# Patient Record
Sex: MALE | Race: WHITE | NOT HISPANIC OR LATINO | Employment: FULL TIME | URBAN - NONMETROPOLITAN AREA
[De-identification: names, ages, dates, MRNs, and addresses within clinical notes are randomized per-mention and may not be internally consistent; named-entity substitution may affect disease eponyms.]

---

## 2023-09-27 ENCOUNTER — OFFICE VISIT (OUTPATIENT)
Dept: PRIMARY CARE | Facility: CLINIC | Age: 33
End: 2023-09-27
Payer: COMMERCIAL

## 2023-09-27 VITALS
HEART RATE: 105 BPM | HEIGHT: 66 IN | DIASTOLIC BLOOD PRESSURE: 90 MMHG | WEIGHT: 122.25 LBS | SYSTOLIC BLOOD PRESSURE: 140 MMHG | BODY MASS INDEX: 19.65 KG/M2

## 2023-09-27 DIAGNOSIS — Z00.00 WELL ADULT EXAM: Primary | ICD-10-CM

## 2023-09-27 DIAGNOSIS — F41.9 ANXIETY: ICD-10-CM

## 2023-09-27 DIAGNOSIS — F32.0 CURRENT MILD EPISODE OF MAJOR DEPRESSIVE DISORDER WITHOUT PRIOR EPISODE (CMS-HCC): ICD-10-CM

## 2023-09-27 DIAGNOSIS — E66.9 BMI (BODY MASS INDEX) PEDIATRIC, > 99% FOR AGE, OBESE CHILD, TERTIARY CARE INTERVENTION: ICD-10-CM

## 2023-09-27 PROCEDURE — 99385 PREV VISIT NEW AGE 18-39: CPT | Performed by: NURSE PRACTITIONER

## 2023-09-27 PROCEDURE — 3008F BODY MASS INDEX DOCD: CPT | Performed by: NURSE PRACTITIONER

## 2023-09-27 PROCEDURE — 1036F TOBACCO NON-USER: CPT | Performed by: NURSE PRACTITIONER

## 2023-09-27 RX ORDER — CITALOPRAM 10 MG/1
10 TABLET ORAL DAILY
Qty: 90 EACH | Refills: 0 | Status: SHIPPED | OUTPATIENT
Start: 2023-09-27

## 2023-09-27 ASSESSMENT — ENCOUNTER SYMPTOMS
DYSURIA: 0
PALPITATIONS: 0
NAUSEA: 0
CONSTIPATION: 0
FATIGUE: 0
COLOR CHANGE: 0
BLOOD IN STOOL: 0
SHORTNESS OF BREATH: 0
NERVOUS/ANXIOUS: 1
DYSPHORIC MOOD: 0
HEADACHES: 0
DIZZINESS: 0
ABDOMINAL PAIN: 0
CHEST TIGHTNESS: 0
LIGHT-HEADEDNESS: 0
SLEEP DISTURBANCE: 0
ARTHRALGIAS: 0
VOMITING: 0
DIARRHEA: 0
MYALGIAS: 0

## 2023-09-27 ASSESSMENT — PATIENT HEALTH QUESTIONNAIRE - PHQ9
5. POOR APPETITE OR OVEREATING: NOT AT ALL
4. FEELING TIRED OR HAVING LITTLE ENERGY: NOT AT ALL
8. MOVING OR SPEAKING SO SLOWLY THAT OTHER PEOPLE COULD HAVE NOTICED. OR THE OPPOSITE, BEING SO FIGETY OR RESTLESS THAT YOU HAVE BEEN MOVING AROUND A LOT MORE THAN USUAL: NOT AT ALL
3. TROUBLE FALLING OR STAYING ASLEEP OR SLEEPING TOO MUCH: NEARLY EVERY DAY
SUM OF ALL RESPONSES TO PHQ QUESTIONS 1-9: 10
2. FEELING DOWN, DEPRESSED OR HOPELESS: NEARLY EVERY DAY
SUM OF ALL RESPONSES TO PHQ9 QUESTIONS 1 AND 2: 4
7. TROUBLE CONCENTRATING ON THINGS, SUCH AS READING THE NEWSPAPER OR WATCHING TELEVISION: NOT AT ALL
1. LITTLE INTEREST OR PLEASURE IN DOING THINGS: SEVERAL DAYS
6. FEELING BAD ABOUT YOURSELF - OR THAT YOU ARE A FAILURE OR HAVE LET YOURSELF OR YOUR FAMILY DOWN: NEARLY EVERY DAY
10. IF YOU CHECKED OFF ANY PROBLEMS, HOW DIFFICULT HAVE THESE PROBLEMS MADE IT FOR YOU TO DO YOUR WORK, TAKE CARE OF THINGS AT HOME, OR GET ALONG WITH OTHER PEOPLE: EXTREMELY DIFFICULT
9. THOUGHTS THAT YOU WOULD BE BETTER OFF DEAD, OR OF HURTING YOURSELF: NOT AT ALL

## 2023-09-27 ASSESSMENT — ANXIETY QUESTIONNAIRES
6. BECOMING EASILY ANNOYED OR IRRITABLE: NOT AT ALL
7. FEELING AFRAID AS IF SOMETHING AWFUL MIGHT HAPPEN: NOT AT ALL
2. NOT BEING ABLE TO STOP OR CONTROL WORRYING: SEVERAL DAYS
GAD7 TOTAL SCORE: 10
4. TROUBLE RELAXING: MORE THAN HALF THE DAYS
5. BEING SO RESTLESS THAT IT IS HARD TO SIT STILL: SEVERAL DAYS
3. WORRYING TOO MUCH ABOUT DIFFERENT THINGS: NEARLY EVERY DAY
1. FEELING NERVOUS, ANXIOUS, OR ON EDGE: NEARLY EVERY DAY

## 2023-09-27 NOTE — PROGRESS NOTES
"Subjective   Patient ID: Grant Carey is a 33 y.o. male who presents for Establish Care.    HPI   Grant here for NPV. Well adult exam. Has concerns regarding increase in anxiety.    Anxiety: seeing [facility] for counseling.  Has been getting panic attacks when he wake up in the mornings, and it will take him a while to \"feel\" better. He works and it is causing difficulty at work with feeling like he is making mistakes.  Would like to get started on medication. He reports that he has been on medication in the past when he was younger. He has taken Celexa and Zoloft. He tolerated both but did not really take them as \"it was a different time in my life.\" He denies suicidal ideations.     No other concerns today.       Review of Systems   Constitutional:  Negative for fatigue.   HENT: Negative.     Respiratory:  Negative for chest tightness and shortness of breath.    Cardiovascular:  Negative for chest pain, palpitations and leg swelling.   Gastrointestinal:  Negative for abdominal pain, blood in stool, constipation, diarrhea, nausea and vomiting.   Genitourinary:  Negative for dysuria.   Musculoskeletal:  Negative for arthralgias and myalgias.   Skin:  Negative for color change.   Neurological:  Negative for dizziness, light-headedness and headaches.   Psychiatric/Behavioral:  Negative for dysphoric mood, self-injury, sleep disturbance and suicidal ideas. The patient is nervous/anxious.        Objective   /90   Pulse 105   Ht 1.676 m (5' 6\")   Wt 55.5 kg (122 lb 4 oz)   BMI 19.73 kg/m²     Physical Exam  Vitals and nursing note reviewed.   Constitutional:       Appearance: Normal appearance.   HENT:      Head: Normocephalic and atraumatic.   Cardiovascular:      Rate and Rhythm: Normal rate and regular rhythm.      Pulses: Normal pulses.      Heart sounds: Normal heart sounds.   Pulmonary:      Effort: Pulmonary effort is normal.      Breath sounds: Normal breath sounds.   Abdominal:      General: Bowel " sounds are normal.      Palpations: Abdomen is soft.   Skin:     General: Skin is warm and dry.   Neurological:      General: No focal deficit present.      Mental Status: He is alert and oriented to person, place, and time.   Psychiatric:         Mood and Affect: Mood normal.         Behavior: Behavior normal.         Thought Content: Thought content normal.         Judgment: Judgment normal.         Assessment/Plan   Problem List Items Addressed This Visit             ICD-10-CM    BMI (body mass index) pediatric, > 99% for age, obese child, tertiary care intervention E66.9, Z68.54    Anxiety F41.9     Start Celexa 10 mg daily.   Discussed side effects of medication and to let us know if he has worsening of anxiety/depression and we will switch to a different medication.   GAD7: 10/21 moderate anxiety         Relevant Medications    citalopram (CeleXA) 10 mg tablet    Current mild episode of major depressive disorder without prior episode (CMS/HCC) F32.0     Start Celexa 10 mg daily.   PHQ9: 10/27 mild depression          Other Visit Diagnoses         Codes    Well adult exam    -  Primary Z00.00    Relevant Orders    Comprehensive Metabolic Panel    CBC and Auto Differential    Lipid Panel              Follow up in 1 month for medication check. We will also get lab work as he has not had wellness labs. We will call him with these results and follow up when he returns.

## 2023-09-27 NOTE — ASSESSMENT & PLAN NOTE
Start Celexa 10 mg daily.   Discussed side effects of medication and to let us know if he has worsening of anxiety/depression and we will switch to a different medication.   GAD7: 10/21 moderate anxiety

## 2023-10-31 ENCOUNTER — APPOINTMENT (OUTPATIENT)
Dept: PRIMARY CARE | Facility: CLINIC | Age: 33
End: 2023-10-31
Payer: COMMERCIAL

## 2024-03-10 ENCOUNTER — HOSPITAL ENCOUNTER (EMERGENCY)
Facility: HOSPITAL | Age: 34
Discharge: HOME | End: 2024-03-10
Attending: EMERGENCY MEDICINE
Payer: COMMERCIAL

## 2024-03-10 VITALS
HEIGHT: 67 IN | RESPIRATION RATE: 18 BRPM | DIASTOLIC BLOOD PRESSURE: 88 MMHG | TEMPERATURE: 98.1 F | OXYGEN SATURATION: 99 % | BODY MASS INDEX: 20.4 KG/M2 | SYSTOLIC BLOOD PRESSURE: 121 MMHG | WEIGHT: 130 LBS | HEART RATE: 82 BPM

## 2024-03-10 DIAGNOSIS — T69.021A TRENCH FOOT OF RIGHT LOWER EXTREMITY, INITIAL ENCOUNTER: Primary | ICD-10-CM

## 2024-03-10 PROCEDURE — 99283 EMERGENCY DEPT VISIT LOW MDM: CPT

## 2024-03-10 PROCEDURE — 99282 EMERGENCY DEPT VISIT SF MDM: CPT

## 2024-03-10 RX ORDER — TRIAMCINOLONE ACETONIDE 1 MG/G
1 CREAM TOPICAL 2 TIMES DAILY
Qty: 15 G | Refills: 0 | Status: SHIPPED | OUTPATIENT
Start: 2024-03-10 | End: 2024-03-20

## 2024-03-10 SDOH — HEALTH STABILITY: MENTAL HEALTH: BEHAVIORS/MOOD: ANXIOUS

## 2024-03-10 ASSESSMENT — COLUMBIA-SUICIDE SEVERITY RATING SCALE - C-SSRS
1. IN THE PAST MONTH, HAVE YOU WISHED YOU WERE DEAD OR WISHED YOU COULD GO TO SLEEP AND NOT WAKE UP?: NO
6. HAVE YOU EVER DONE ANYTHING, STARTED TO DO ANYTHING, OR PREPARED TO DO ANYTHING TO END YOUR LIFE?: NO
2. HAVE YOU ACTUALLY HAD ANY THOUGHTS OF KILLING YOURSELF?: NO

## 2024-03-10 ASSESSMENT — PAIN - FUNCTIONAL ASSESSMENT: PAIN_FUNCTIONAL_ASSESSMENT: 0-10

## 2024-03-10 ASSESSMENT — PAIN DESCRIPTION - PROGRESSION: CLINICAL_PROGRESSION: NOT CHANGED

## 2024-03-10 ASSESSMENT — PAIN SCALES - GENERAL: PAINLEVEL_OUTOF10: 0 - NO PAIN

## 2024-03-10 NOTE — ED PROVIDER NOTES
HPI   Chief Complaint   Patient presents with    Foot Injury     Patient presents with red, painful foot burning since December. Symptoms are only on right foot and began after exfoliating feet in December. Patient reports redness and pain has continued to get worse since December with the use of antifungals and other over the counter creams. 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 Patient is fearful that he has TB of the skin or skin cancer.        Patient presents with concern for redness to his right foot.  States has been ongoing for months now.  States he believes it started after he exfoliated his feet bilaterally.  After which she noticed redness to the right foot.  He has been applying multiple different types of over-the-counter ointments and creams.  He has not been able to make the redness go away.  He states that occasionally burns.  Patient is concerned that he works with chemicals at work and his feet are wet all the time.    He had a telemedicine visit and was placed on Keflex which she has not started yet.      History provided by:  Patient                      [place] Coma Scale Score: 15                     Patient History   Past Medical History:   Diagnosis Date    Anxiety     Depression      Past Surgical History:   Procedure Laterality Date    CT ANGIO NECK W AND WO IV CONTRAST  07/16/2022    CT NECK ANGIO W AND WO IV CONTRAST 7/16/2022 [facility] EMERGENCY LEGACY    CT HEAD ANGIO W AND WO IV CONTRAST  07/16/2022    CT HEAD ANGIO W AND WO IV CONTRAST 7/16/2022 [facility] EMERGENCY LEGACY    VASECTOMY       Family History   Problem Relation Name Age of Onset    Heart disease Paternal Grandmother      Hypertension Paternal Grandmother       Social History     Tobacco Use    Smoking status: Never    Smokeless tobacco: Never   Vaping Use    Vaping Use: Never used   Substance Use Topics    Alcohol use: Yes     Alcohol/week: 2.0  standard drinks of alcohol     Types: 2 Cans of beer per week    Drug use: Never       Physical Exam   ED Triage Vitals [03/10/24 1704]   Temperature Heart Rate Respirations BP   36.7 °C (98.1 °F) 82 18 121/88      Pulse Ox Temp Source Heart Rate Source Patient Position   99 % Temporal Monitor Sitting      BP Location FiO2 (%)     Left arm --       Physical Exam  Vitals and nursing note reviewed.   Constitutional:       General: He is not in acute distress.     Appearance: Normal appearance. He is normal weight. He is not ill-appearing or toxic-appearing.   HENT:      Head: Normocephalic.   Eyes:      General: No scleral icterus.     Conjunctiva/sclera: Conjunctivae normal.   Cardiovascular:      Pulses:           Dorsalis pedis pulses are 2+ on the right side.        Posterior tibial pulses are 2+ on the right side.   Musculoskeletal:        Feet:    Feet:      Right foot:      Skin integrity: No ulcer, blister, skin breakdown or fissure.      Toenail Condition: Right toenails are normal.   Skin:     General: Skin is warm.      Capillary Refill: Capillary refill takes less than 2 seconds.   Neurological:      General: No focal deficit present.      Mental Status: He is alert and oriented to person, place, and time.      Cranial Nerves: No cranial nerve deficit or facial asymmetry.      Sensory: No sensory deficit.      Motor: No weakness.      Gait: Gait normal.   Psychiatric:         Attention and Perception: Attention and perception normal.         Mood and Affect: Mood and affect normal.         Speech: Speech normal.         Behavior: Behavior normal. Behavior is cooperative.         Thought Content: Thought content normal.         Cognition and Memory: Cognition and memory normal.         Judgment: Judgment normal.         ED Course & MDM   Diagnoses as of 03/10/24 1715   Trench foot of right lower extremity, initial encounter       Medical Decision Making  Patient presents with concern for redness to his  right foot.  States has been ongoing for months now.  States he believes it started after he exfoliated his feet bilaterally.  After which she noticed redness to the right foot.  He has been applying multiple different types of over-the-counter ointments and creams.  He has not been able to make the redness go away.  He states that occasionally burns.  Patient is concerned that he works with chemicals at work and his feet are wet all the time.    He had a telemedicine visit and was placed on Keflex which she has not started yet.    Ddx: Contact dermatitis, trench foot, tinea pedis, other    As patient states that his feet are wet daily during working hours most likely patient is suffering from trench foot although it is odd that is not bilateral.  Will place patient on steroid ointment.  Follow-up with podiatry.  He may start the antibiotic as directed.  He is encouraged to keep his feet dry and wear waterproof boots at work.  Patient discharged home in improved and stable condition        Procedure  Procedures     Maame Alexandra PA-C  03/10/24 0777

## 2024-12-30 ENCOUNTER — APPOINTMENT (OUTPATIENT)
Dept: CARDIOLOGY | Facility: HOSPITAL | Age: 34
End: 2024-12-30
Payer: COMMERCIAL

## 2024-12-30 ENCOUNTER — HOSPITAL ENCOUNTER (INPATIENT)
Facility: HOSPITAL | Age: 34
LOS: 1 days | Discharge: LEFT BEFORE DISCHARGE | End: 2024-12-31
Attending: EMERGENCY MEDICINE | Admitting: STUDENT IN AN ORGANIZED HEALTH CARE EDUCATION/TRAINING PROGRAM
Payer: COMMERCIAL

## 2024-12-30 DIAGNOSIS — E87.1 HYPONATREMIA: Primary | ICD-10-CM

## 2024-12-30 DIAGNOSIS — E83.42 HYPOMAGNESEMIA: ICD-10-CM

## 2024-12-30 DIAGNOSIS — F10.939 ALCOHOL WITHDRAWAL SYNDROME WITH COMPLICATION (MULTI): ICD-10-CM

## 2024-12-30 DIAGNOSIS — F10.10 ALCOHOL ABUSE: ICD-10-CM

## 2024-12-30 LAB
ALBUMIN SERPL BCP-MCNC: 4.3 G/DL (ref 3.4–5)
ALP SERPL-CCNC: 108 U/L (ref 33–120)
ALT SERPL W P-5'-P-CCNC: 56 U/L (ref 10–52)
ANION GAP SERPL CALC-SCNC: 17 MMOL/L (ref 10–20)
AST SERPL W P-5'-P-CCNC: 96 U/L (ref 9–39)
BASOPHILS # BLD AUTO: 0.08 X10*3/UL (ref 0–0.1)
BASOPHILS NFR BLD AUTO: 0.4 %
BILIRUB SERPL-MCNC: 2.5 MG/DL (ref 0–1.2)
BUN SERPL-MCNC: 8 MG/DL (ref 6–23)
CALCIUM SERPL-MCNC: 8.3 MG/DL (ref 8.6–10.3)
CHLORIDE SERPL-SCNC: 77 MMOL/L (ref 98–107)
CO2 SERPL-SCNC: 23 MMOL/L (ref 21–32)
CREAT SERPL-MCNC: 0.52 MG/DL (ref 0.5–1.3)
EGFRCR SERPLBLD CKD-EPI 2021: >90 ML/MIN/1.73M*2
EOSINOPHIL # BLD AUTO: 0.02 X10*3/UL (ref 0–0.7)
EOSINOPHIL NFR BLD AUTO: 0.1 %
ERYTHROCYTE [DISTWIDTH] IN BLOOD BY AUTOMATED COUNT: 11 % (ref 11.5–14.5)
ETHANOL SERPL-MCNC: <10 MG/DL
GLUCOSE SERPL-MCNC: 113 MG/DL (ref 74–99)
HCT VFR BLD AUTO: 36.4 % (ref 41–52)
HGB BLD-MCNC: 13.6 G/DL (ref 13.5–17.5)
IMM GRANULOCYTES # BLD AUTO: 0.12 X10*3/UL (ref 0–0.7)
IMM GRANULOCYTES NFR BLD AUTO: 0.5 % (ref 0–0.9)
LIPASE SERPL-CCNC: 21 U/L (ref 9–82)
LYMPHOCYTES # BLD AUTO: 2.22 X10*3/UL (ref 1.2–4.8)
LYMPHOCYTES NFR BLD AUTO: 10 %
MAGNESIUM SERPL-MCNC: 1.21 MG/DL (ref 1.6–2.4)
MCH RBC QN AUTO: 30.8 PG (ref 26–34)
MCHC RBC AUTO-ENTMCNC: 37.4 G/DL (ref 32–36)
MCV RBC AUTO: 83 FL (ref 80–100)
MONOCYTES # BLD AUTO: 1.72 X10*3/UL (ref 0.1–1)
MONOCYTES NFR BLD AUTO: 7.7 %
NEUTROPHILS # BLD AUTO: 18.05 X10*3/UL (ref 1.2–7.7)
NEUTROPHILS NFR BLD AUTO: 81.3 %
NRBC BLD-RTO: 0 /100 WBCS (ref 0–0)
PLATELET # BLD AUTO: 244 X10*3/UL (ref 150–450)
POTASSIUM SERPL-SCNC: 3.4 MMOL/L (ref 3.5–5.3)
PROT SERPL-MCNC: 6.6 G/DL (ref 6.4–8.2)
RBC # BLD AUTO: 4.41 X10*6/UL (ref 4.5–5.9)
SODIUM SERPL-SCNC: 114 MMOL/L (ref 136–145)
WBC # BLD AUTO: 22.2 X10*3/UL (ref 4.4–11.3)

## 2024-12-30 PROCEDURE — 96365 THER/PROPH/DIAG IV INF INIT: CPT

## 2024-12-30 PROCEDURE — 83690 ASSAY OF LIPASE: CPT | Performed by: EMERGENCY MEDICINE

## 2024-12-30 PROCEDURE — 36415 COLL VENOUS BLD VENIPUNCTURE: CPT | Performed by: EMERGENCY MEDICINE

## 2024-12-30 PROCEDURE — 80053 COMPREHEN METABOLIC PANEL: CPT | Performed by: EMERGENCY MEDICINE

## 2024-12-30 PROCEDURE — 2500000004 HC RX 250 GENERAL PHARMACY W/ HCPCS (ALT 636 FOR OP/ED): Performed by: EMERGENCY MEDICINE

## 2024-12-30 PROCEDURE — 83735 ASSAY OF MAGNESIUM: CPT | Performed by: EMERGENCY MEDICINE

## 2024-12-30 PROCEDURE — 82077 ASSAY SPEC XCP UR&BREATH IA: CPT | Performed by: EMERGENCY MEDICINE

## 2024-12-30 PROCEDURE — 99285 EMERGENCY DEPT VISIT HI MDM: CPT | Mod: 25 | Performed by: EMERGENCY MEDICINE

## 2024-12-30 PROCEDURE — 93005 ELECTROCARDIOGRAM TRACING: CPT

## 2024-12-30 PROCEDURE — 96361 HYDRATE IV INFUSION ADD-ON: CPT

## 2024-12-30 PROCEDURE — 96375 TX/PRO/DX INJ NEW DRUG ADDON: CPT

## 2024-12-30 PROCEDURE — 85025 COMPLETE CBC W/AUTO DIFF WBC: CPT | Performed by: EMERGENCY MEDICINE

## 2024-12-30 PROCEDURE — 2500000004 HC RX 250 GENERAL PHARMACY W/ HCPCS (ALT 636 FOR OP/ED)

## 2024-12-30 RX ORDER — PROCHLORPERAZINE EDISYLATE 5 MG/ML
INJECTION INTRAMUSCULAR; INTRAVENOUS
Status: COMPLETED
Start: 2024-12-30 | End: 2024-12-30

## 2024-12-30 RX ORDER — LORAZEPAM 2 MG/ML
2 INJECTION INTRAMUSCULAR ONCE
Status: COMPLETED | OUTPATIENT
Start: 2024-12-30 | End: 2024-12-30

## 2024-12-30 RX ORDER — PROCHLORPERAZINE EDISYLATE 5 MG/ML
5 INJECTION INTRAMUSCULAR; INTRAVENOUS ONCE
Status: COMPLETED | OUTPATIENT
Start: 2024-12-30 | End: 2024-12-30

## 2024-12-30 RX ORDER — ONDANSETRON HYDROCHLORIDE 2 MG/ML
4 INJECTION, SOLUTION INTRAVENOUS ONCE
Status: DISCONTINUED | OUTPATIENT
Start: 2024-12-30 | End: 2024-12-30

## 2024-12-30 RX ORDER — MAGNESIUM SULFATE HEPTAHYDRATE 40 MG/ML
2 INJECTION, SOLUTION INTRAVENOUS ONCE
Status: COMPLETED | OUTPATIENT
Start: 2024-12-30 | End: 2024-12-31

## 2024-12-30 RX ADMIN — MAGNESIUM SULFATE HEPTAHYDRATE 2 G: 40 INJECTION, SOLUTION INTRAVENOUS at 23:56

## 2024-12-30 RX ADMIN — SODIUM CHLORIDE 1000 ML: 9 INJECTION, SOLUTION INTRAVENOUS at 21:28

## 2024-12-30 RX ADMIN — LORAZEPAM 2 MG: 2 INJECTION INTRAMUSCULAR; INTRAVENOUS at 21:31

## 2024-12-30 RX ADMIN — PROCHLORPERAZINE EDISYLATE 5 MG: 5 INJECTION INTRAMUSCULAR; INTRAVENOUS at 21:29

## 2024-12-30 SDOH — HEALTH STABILITY: MENTAL HEALTH: BEHAVIORAL HEALTH(WDL): EXCEPTIONS TO WDL

## 2024-12-30 SDOH — HEALTH STABILITY: MENTAL HEALTH: NEEDS EXPRESSED: EMOTIONAL

## 2024-12-30 SDOH — HEALTH STABILITY: MENTAL HEALTH: BEHAVIORS/MOOD: ANXIOUS;RESTLESS

## 2024-12-30 SDOH — SOCIAL STABILITY: SOCIAL NETWORK: PARENT/GUARDIAN/SIGNIFICANT OTHER INVOLVEMENT: ATTENTIVE TO PATIENT NEEDS

## 2024-12-30 ASSESSMENT — LIFESTYLE VARIABLES
VISUAL DISTURBANCES: NOT PRESENT
AUDITORY DISTURBANCES: NOT PRESENT
VISUAL DISTURBANCES: NOT PRESENT
HEADACHE, FULLNESS IN HEAD: NOT PRESENT
AGITATION: NORMAL ACTIVITY
NAUSEA AND VOMITING: 3
HEADACHE, FULLNESS IN HEAD: VERY MILD
PULSE: 77
BLOOD PRESSURE: 150/90
TOTAL SCORE: 3
TREMOR: NOT VISIBLE, BUT CAN BE FELT FINGERTIP TO FINGERTIP
ANXIETY: 3
TREMOR: MODERATE, WITH PATIENT'S ARMS EXTENDED
BLOOD PRESSURE: 141/84
AUDITORY DISTURBANCES: NOT PRESENT
HEADACHE, FULLNESS IN HEAD: NOT PRESENT
ORIENTATION AND CLOUDING OF SENSORIUM: ORIENTED AND CAN DO SERIAL ADDITIONS
ORIENTATION AND CLOUDING OF SENSORIUM: ORIENTED AND CAN DO SERIAL ADDITIONS
TOTAL SCORE: 10
AGITATION: NORMAL ACTIVITY
TREMOR: NOT VISIBLE, BUT CAN BE FELT FINGERTIP TO FINGERTIP
ANXIETY: MILDLY ANXIOUS
AGITATION: NORMAL ACTIVITY
NAUSEA AND VOMITING: NO NAUSEA AND NO VOMITING
VISUAL DISTURBANCES: NOT PRESENT
ANXIETY: NO ANXIETY, AT EASE
PAROXYSMAL SWEATS: 2
PULSE: 82
NAUSEA AND VOMITING: MILD NAUSEA WITH NO VOMITING
PAROXYSMAL SWEATS: NO SWEAT VISIBLE
ORIENTATION AND CLOUDING OF SENSORIUM: ORIENTED AND CAN DO SERIAL ADDITIONS
PAROXYSMAL SWEATS: 2
AUDITORY DISTURBANCES: NOT PRESENT
TOTAL SCORE: 6

## 2024-12-30 ASSESSMENT — COLUMBIA-SUICIDE SEVERITY RATING SCALE - C-SSRS
6. HAVE YOU EVER DONE ANYTHING, STARTED TO DO ANYTHING, OR PREPARED TO DO ANYTHING TO END YOUR LIFE?: NO
1. IN THE PAST MONTH, HAVE YOU WISHED YOU WERE DEAD OR WISHED YOU COULD GO TO SLEEP AND NOT WAKE UP?: NO
2. HAVE YOU ACTUALLY HAD ANY THOUGHTS OF KILLING YOURSELF?: NO

## 2024-12-31 VITALS
HEART RATE: 73 BPM | SYSTOLIC BLOOD PRESSURE: 143 MMHG | OXYGEN SATURATION: 99 % | WEIGHT: 129.41 LBS | DIASTOLIC BLOOD PRESSURE: 99 MMHG | HEIGHT: 67 IN | BODY MASS INDEX: 20.31 KG/M2 | RESPIRATION RATE: 22 BRPM | TEMPERATURE: 97.9 F

## 2024-12-31 PROBLEM — E87.1 HYPONATREMIA: Status: ACTIVE | Noted: 2024-12-31

## 2024-12-31 LAB
ALBUMIN SERPL BCP-MCNC: 4 G/DL (ref 3.4–5)
ALBUMIN SERPL BCP-MCNC: 4.2 G/DL (ref 3.4–5)
ALBUMIN SERPL BCP-MCNC: 4.3 G/DL (ref 3.4–5)
ALP SERPL-CCNC: 106 U/L (ref 33–120)
ALP SERPL-CCNC: 114 U/L (ref 33–120)
ALP SERPL-CCNC: 115 U/L (ref 33–120)
ALT SERPL W P-5'-P-CCNC: 49 U/L (ref 10–52)
ALT SERPL W P-5'-P-CCNC: 52 U/L (ref 10–52)
ALT SERPL W P-5'-P-CCNC: 53 U/L (ref 10–52)
ANION GAP SERPL CALC-SCNC: 11 MMOL/L (ref 10–20)
ANION GAP SERPL CALC-SCNC: 14 MMOL/L (ref 10–20)
ANION GAP SERPL CALC-SCNC: 9 MMOL/L (ref 10–20)
AST SERPL W P-5'-P-CCNC: 59 U/L (ref 9–39)
AST SERPL W P-5'-P-CCNC: 72 U/L (ref 9–39)
AST SERPL W P-5'-P-CCNC: 75 U/L (ref 9–39)
ATRIAL RATE: 90 BPM
BILIRUB SERPL-MCNC: 1.7 MG/DL (ref 0–1.2)
BILIRUB SERPL-MCNC: 2.4 MG/DL (ref 0–1.2)
BILIRUB SERPL-MCNC: 3.3 MG/DL (ref 0–1.2)
BUN SERPL-MCNC: 5 MG/DL (ref 6–23)
BUN SERPL-MCNC: 7 MG/DL (ref 6–23)
BUN SERPL-MCNC: 7 MG/DL (ref 6–23)
CALCIUM SERPL-MCNC: 8.6 MG/DL (ref 8.6–10.3)
CHLORIDE SERPL-SCNC: 94 MMOL/L (ref 98–107)
CHLORIDE SERPL-SCNC: 98 MMOL/L (ref 98–107)
CHLORIDE SERPL-SCNC: 99 MMOL/L (ref 98–107)
CO2 SERPL-SCNC: 27 MMOL/L (ref 21–32)
CO2 SERPL-SCNC: 27 MMOL/L (ref 21–32)
CO2 SERPL-SCNC: 30 MMOL/L (ref 21–32)
CREAT SERPL-MCNC: 0.51 MG/DL (ref 0.5–1.3)
CREAT SERPL-MCNC: 0.52 MG/DL (ref 0.5–1.3)
CREAT SERPL-MCNC: 0.6 MG/DL (ref 0.5–1.3)
EGFRCR SERPLBLD CKD-EPI 2021: >90 ML/MIN/1.73M*2
GLUCOSE SERPL-MCNC: 100 MG/DL (ref 74–99)
GLUCOSE SERPL-MCNC: 93 MG/DL (ref 74–99)
GLUCOSE SERPL-MCNC: 97 MG/DL (ref 74–99)
HOLD SPECIMEN: NORMAL
HOLD SPECIMEN: NORMAL
MAGNESIUM SERPL-MCNC: 2.15 MG/DL (ref 1.6–2.4)
MAGNESIUM SERPL-MCNC: 2.25 MG/DL (ref 1.6–2.4)
MAGNESIUM SERPL-MCNC: 2.34 MG/DL (ref 1.6–2.4)
P AXIS: 82 DEGREES
P OFFSET: 199 MS
P ONSET: 150 MS
POTASSIUM SERPL-SCNC: 3.1 MMOL/L (ref 3.5–5.3)
POTASSIUM SERPL-SCNC: 3.7 MMOL/L (ref 3.5–5.3)
POTASSIUM SERPL-SCNC: 3.9 MMOL/L (ref 3.5–5.3)
PR INTERVAL: 134 MS
PROT SERPL-MCNC: 6 G/DL (ref 6.4–8.2)
PROT SERPL-MCNC: 6.5 G/DL (ref 6.4–8.2)
PROT SERPL-MCNC: 6.7 G/DL (ref 6.4–8.2)
Q ONSET: 217 MS
QRS COUNT: 15 BEATS
QRS DURATION: 96 MS
QT INTERVAL: 402 MS
QTC CALCULATION(BAZETT): 491 MS
QTC FREDERICIA: 460 MS
R AXIS: 82 DEGREES
SODIUM SERPL-SCNC: 131 MMOL/L (ref 136–145)
SODIUM SERPL-SCNC: 133 MMOL/L (ref 136–145)
SODIUM SERPL-SCNC: 134 MMOL/L (ref 136–145)
T AXIS: 71 DEGREES
T OFFSET: 418 MS
VENTRICULAR RATE: 90 BPM

## 2024-12-31 PROCEDURE — 83735 ASSAY OF MAGNESIUM: CPT | Performed by: STUDENT IN AN ORGANIZED HEALTH CARE EDUCATION/TRAINING PROGRAM

## 2024-12-31 PROCEDURE — 2500000004 HC RX 250 GENERAL PHARMACY W/ HCPCS (ALT 636 FOR OP/ED): Performed by: STUDENT IN AN ORGANIZED HEALTH CARE EDUCATION/TRAINING PROGRAM

## 2024-12-31 PROCEDURE — 36415 COLL VENOUS BLD VENIPUNCTURE: CPT | Performed by: STUDENT IN AN ORGANIZED HEALTH CARE EDUCATION/TRAINING PROGRAM

## 2024-12-31 PROCEDURE — 2500000002 HC RX 250 W HCPCS SELF ADMINISTERED DRUGS (ALT 637 FOR MEDICARE OP, ALT 636 FOR OP/ED): Performed by: INTERNAL MEDICINE

## 2024-12-31 PROCEDURE — S4991 NICOTINE PATCH NONLEGEND: HCPCS | Performed by: INTERNAL MEDICINE

## 2024-12-31 PROCEDURE — 80053 COMPREHEN METABOLIC PANEL: CPT | Performed by: STUDENT IN AN ORGANIZED HEALTH CARE EDUCATION/TRAINING PROGRAM

## 2024-12-31 PROCEDURE — 99291 CRITICAL CARE FIRST HOUR: CPT | Performed by: STUDENT IN AN ORGANIZED HEALTH CARE EDUCATION/TRAINING PROGRAM

## 2024-12-31 PROCEDURE — 2500000001 HC RX 250 WO HCPCS SELF ADMINISTERED DRUGS (ALT 637 FOR MEDICARE OP): Performed by: STUDENT IN AN ORGANIZED HEALTH CARE EDUCATION/TRAINING PROGRAM

## 2024-12-31 PROCEDURE — 2020000001 HC ICU ROOM DAILY

## 2024-12-31 RX ORDER — CITALOPRAM 10 MG/1
10 TABLET ORAL DAILY
Status: DISCONTINUED | OUTPATIENT
Start: 2024-12-31 | End: 2024-12-31 | Stop reason: CLARIF

## 2024-12-31 RX ORDER — LORAZEPAM 2 MG/ML
2 INJECTION INTRAMUSCULAR EVERY 2 HOUR PRN
Status: DISCONTINUED | OUTPATIENT
Start: 2024-12-31 | End: 2024-12-31 | Stop reason: HOSPADM

## 2024-12-31 RX ORDER — THIAMINE HYDROCHLORIDE 100 MG/ML
100 INJECTION, SOLUTION INTRAMUSCULAR; INTRAVENOUS DAILY
Status: DISCONTINUED | OUTPATIENT
Start: 2024-12-31 | End: 2024-12-31 | Stop reason: HOSPADM

## 2024-12-31 RX ORDER — IBUPROFEN 200 MG
1 TABLET ORAL DAILY
Status: DISCONTINUED | OUTPATIENT
Start: 2024-12-31 | End: 2024-12-31 | Stop reason: HOSPADM

## 2024-12-31 RX ORDER — IBUPROFEN 200 MG
200 TABLET ORAL NIGHTLY
COMMUNITY

## 2024-12-31 RX ORDER — ONDANSETRON HYDROCHLORIDE 2 MG/ML
4 INJECTION, SOLUTION INTRAVENOUS EVERY 4 HOURS PRN
Status: DISCONTINUED | OUTPATIENT
Start: 2024-12-31 | End: 2024-12-31 | Stop reason: HOSPADM

## 2024-12-31 RX ORDER — LORAZEPAM 2 MG/ML
0.5 INJECTION INTRAMUSCULAR EVERY 2 HOUR PRN
Status: DISCONTINUED | OUTPATIENT
Start: 2024-12-31 | End: 2024-12-31 | Stop reason: HOSPADM

## 2024-12-31 RX ORDER — FOLIC ACID 1 MG/1
1 TABLET ORAL DAILY
Status: DISCONTINUED | OUTPATIENT
Start: 2024-12-31 | End: 2024-12-31 | Stop reason: HOSPADM

## 2024-12-31 RX ORDER — TALC
6 POWDER (GRAM) TOPICAL NIGHTLY
Status: DISCONTINUED | OUTPATIENT
Start: 2024-12-31 | End: 2024-12-31 | Stop reason: HOSPADM

## 2024-12-31 RX ORDER — NALTREXONE HYDROCHLORIDE 50 MG/1
50 TABLET, FILM COATED ORAL NIGHTLY
COMMUNITY
Start: 2024-08-12

## 2024-12-31 RX ORDER — SODIUM CHLORIDE 9 MG/ML
100 INJECTION, SOLUTION INTRAVENOUS CONTINUOUS
Status: DISCONTINUED | OUTPATIENT
Start: 2024-12-31 | End: 2024-12-31 | Stop reason: HOSPADM

## 2024-12-31 RX ORDER — LANOLIN ALCOHOL/MO/W.PET/CERES
100 CREAM (GRAM) TOPICAL DAILY
Status: DISCONTINUED | OUTPATIENT
Start: 2025-01-03 | End: 2024-12-31 | Stop reason: HOSPADM

## 2024-12-31 RX ORDER — ESCITALOPRAM OXALATE 10 MG/1
5 TABLET ORAL DAILY
Status: DISCONTINUED | OUTPATIENT
Start: 2024-12-31 | End: 2024-12-31 | Stop reason: HOSPADM

## 2024-12-31 RX ORDER — LORAZEPAM 2 MG/ML
1 INJECTION INTRAMUSCULAR EVERY 2 HOUR PRN
Status: DISCONTINUED | OUTPATIENT
Start: 2024-12-31 | End: 2024-12-31 | Stop reason: HOSPADM

## 2024-12-31 RX ORDER — ACAMPROSATE CALCIUM 333 MG/1
333 TABLET, DELAYED RELEASE ORAL 2 TIMES DAILY
Status: DISCONTINUED | OUTPATIENT
Start: 2024-12-31 | End: 2024-12-31 | Stop reason: HOSPADM

## 2024-12-31 RX ORDER — MULTIVIT-MIN/IRON FUM/FOLIC AC 7.5 MG-4
1 TABLET ORAL DAILY
Status: DISCONTINUED | OUTPATIENT
Start: 2024-12-31 | End: 2024-12-31 | Stop reason: HOSPADM

## 2024-12-31 RX ORDER — NALTREXONE HYDROCHLORIDE 50 MG/1
50 TABLET, FILM COATED ORAL NIGHTLY
Status: DISCONTINUED | OUTPATIENT
Start: 2024-12-31 | End: 2024-12-31 | Stop reason: HOSPADM

## 2024-12-31 RX ORDER — ACAMPROSATE CALCIUM 333 MG/1
1 TABLET, DELAYED RELEASE ORAL 2 TIMES DAILY
COMMUNITY
Start: 2024-08-09

## 2024-12-31 RX ORDER — HYDROXYZINE HYDROCHLORIDE 50 MG/1
25 TABLET, FILM COATED ORAL EVERY 4 HOURS PRN
Status: DISCONTINUED | OUTPATIENT
Start: 2024-12-31 | End: 2024-12-31 | Stop reason: HOSPADM

## 2024-12-31 RX ADMIN — SODIUM CHLORIDE 100 ML/HR: 9 INJECTION, SOLUTION INTRAVENOUS at 07:32

## 2024-12-31 RX ADMIN — LORAZEPAM 0.5 MG: 2 INJECTION INTRAMUSCULAR; INTRAVENOUS at 07:31

## 2024-12-31 RX ADMIN — THIAMINE HYDROCHLORIDE 100 MG: 100 INJECTION, SOLUTION INTRAMUSCULAR; INTRAVENOUS at 09:02

## 2024-12-31 RX ADMIN — NICOTINE 1 PATCH: 21 PATCH, EXTENDED RELEASE TRANSDERMAL at 09:02

## 2024-12-31 RX ADMIN — ESCITALOPRAM OXALATE 5 MG: 10 TABLET ORAL at 09:02

## 2024-12-31 RX ADMIN — LORAZEPAM 0.5 MG: 2 INJECTION INTRAMUSCULAR; INTRAVENOUS at 12:13

## 2024-12-31 RX ADMIN — Medication 1 TABLET: at 09:02

## 2024-12-31 RX ADMIN — FOLIC ACID 1 MG: 1 TABLET ORAL at 09:02

## 2024-12-31 SDOH — SOCIAL STABILITY: SOCIAL INSECURITY
WITHIN THE LAST YEAR, HAVE YOU BEEN RAPED OR FORCED TO HAVE ANY KIND OF SEXUAL ACTIVITY BY YOUR PARTNER OR EX-PARTNER?: NO

## 2024-12-31 SDOH — ECONOMIC STABILITY: HOUSING INSECURITY: IN THE PAST 12 MONTHS, HOW MANY TIMES HAVE YOU MOVED WHERE YOU WERE LIVING?: 0

## 2024-12-31 SDOH — SOCIAL STABILITY: SOCIAL INSECURITY: WITHIN THE LAST YEAR, HAVE YOU BEEN AFRAID OF YOUR PARTNER OR EX-PARTNER?: NO

## 2024-12-31 SDOH — ECONOMIC STABILITY: INCOME INSECURITY: IN THE PAST 12 MONTHS HAS THE ELECTRIC, GAS, OIL, OR WATER COMPANY THREATENED TO SHUT OFF SERVICES IN YOUR HOME?: NO

## 2024-12-31 SDOH — SOCIAL STABILITY: SOCIAL INSECURITY: DOES ANYONE TRY TO KEEP YOU FROM HAVING/CONTACTING OTHER FRIENDS OR DOING THINGS OUTSIDE YOUR HOME?: NO

## 2024-12-31 SDOH — SOCIAL STABILITY: SOCIAL INSECURITY: WITHIN THE LAST YEAR, HAVE YOU BEEN HUMILIATED OR EMOTIONALLY ABUSED IN OTHER WAYS BY YOUR PARTNER OR EX-PARTNER?: NO

## 2024-12-31 SDOH — SOCIAL STABILITY: SOCIAL INSECURITY: HAVE YOU HAD THOUGHTS OF HARMING ANYONE ELSE?: NO

## 2024-12-31 SDOH — SOCIAL STABILITY: SOCIAL INSECURITY: HAS ANYONE EVER THREATENED TO HURT YOUR FAMILY OR YOUR PETS?: NO

## 2024-12-31 SDOH — SOCIAL STABILITY: SOCIAL INSECURITY
WITHIN THE LAST YEAR, HAVE YOU BEEN KICKED, HIT, SLAPPED, OR OTHERWISE PHYSICALLY HURT BY YOUR PARTNER OR EX-PARTNER?: NO

## 2024-12-31 SDOH — ECONOMIC STABILITY: FOOD INSECURITY: WITHIN THE PAST 12 MONTHS, THE FOOD YOU BOUGHT JUST DIDN'T LAST AND YOU DIDN'T HAVE MONEY TO GET MORE.: NEVER TRUE

## 2024-12-31 SDOH — SOCIAL STABILITY: SOCIAL INSECURITY: DO YOU FEEL ANYONE HAS EXPLOITED OR TAKEN ADVANTAGE OF YOU FINANCIALLY OR OF YOUR PERSONAL PROPERTY?: NO

## 2024-12-31 SDOH — ECONOMIC STABILITY: HOUSING INSECURITY: IN THE LAST 12 MONTHS, WAS THERE A TIME WHEN YOU WERE NOT ABLE TO PAY THE MORTGAGE OR RENT ON TIME?: NO

## 2024-12-31 SDOH — SOCIAL STABILITY: SOCIAL INSECURITY: ABUSE: ADULT

## 2024-12-31 SDOH — SOCIAL STABILITY: SOCIAL INSECURITY: ARE YOU OR HAVE YOU BEEN THREATENED OR ABUSED PHYSICALLY, EMOTIONALLY, OR SEXUALLY BY ANYONE?: NO

## 2024-12-31 SDOH — SOCIAL STABILITY: SOCIAL INSECURITY: DO YOU FEEL UNSAFE GOING BACK TO THE PLACE WHERE YOU ARE LIVING?: NO

## 2024-12-31 SDOH — ECONOMIC STABILITY: TRANSPORTATION INSECURITY: IN THE PAST 12 MONTHS, HAS LACK OF TRANSPORTATION KEPT YOU FROM MEDICAL APPOINTMENTS OR FROM GETTING MEDICATIONS?: NO

## 2024-12-31 SDOH — SOCIAL STABILITY: SOCIAL INSECURITY: WERE YOU ABLE TO COMPLETE ALL THE BEHAVIORAL HEALTH SCREENINGS?: YES

## 2024-12-31 SDOH — ECONOMIC STABILITY: HOUSING INSECURITY: AT ANY TIME IN THE PAST 12 MONTHS, WERE YOU HOMELESS OR LIVING IN A SHELTER (INCLUDING NOW)?: NO

## 2024-12-31 SDOH — ECONOMIC STABILITY: FOOD INSECURITY: HOW HARD IS IT FOR YOU TO PAY FOR THE VERY BASICS LIKE FOOD, HOUSING, MEDICAL CARE, AND HEATING?: NOT HARD AT ALL

## 2024-12-31 SDOH — ECONOMIC STABILITY: FOOD INSECURITY: WITHIN THE PAST 12 MONTHS, YOU WORRIED THAT YOUR FOOD WOULD RUN OUT BEFORE YOU GOT THE MONEY TO BUY MORE.: NEVER TRUE

## 2024-12-31 SDOH — SOCIAL STABILITY: SOCIAL INSECURITY: HAVE YOU HAD ANY THOUGHTS OF HARMING ANYONE ELSE?: NO

## 2024-12-31 SDOH — SOCIAL STABILITY: SOCIAL INSECURITY: ARE THERE ANY APPARENT SIGNS OF INJURIES/BEHAVIORS THAT COULD BE RELATED TO ABUSE/NEGLECT?: NO

## 2024-12-31 ASSESSMENT — LIFESTYLE VARIABLES
TREMOR: NOT VISIBLE, BUT CAN BE FELT FINGERTIP TO FINGERTIP
TREMOR: 2
HOW OFTEN DO YOU HAVE A DRINK CONTAINING ALCOHOL: 4 OR MORE TIMES A WEEK
PULSE: 101
ANXIETY: NO ANXIETY, AT EASE
AUDIT-C TOTAL SCORE: 12
PAROXYSMAL SWEATS: NO SWEAT VISIBLE
PULSE: 128
ORIENTATION AND CLOUDING OF SENSORIUM: ORIENTED AND CAN DO SERIAL ADDITIONS
ANXIETY: NO ANXIETY, AT EASE
AGITATION: NORMAL ACTIVITY
AGITATION: NORMAL ACTIVITY
NAUSEA AND VOMITING: NO NAUSEA AND NO VOMITING
HEADACHE, FULLNESS IN HEAD: NOT PRESENT
AUDITORY DISTURBANCES: NOT PRESENT
HEADACHE, FULLNESS IN HEAD: NOT PRESENT
NAUSEA AND VOMITING: NO NAUSEA AND NO VOMITING
HOW OFTEN DURING THE LAST YEAR HAVE YOU FAILED TO DO WHAT WAS NORMALLY EXPECTED FROM YOU BECAUSE OF DRINKING: LESS THAN MONTHLY
AUDITORY DISTURBANCES: NOT PRESENT
ANXIETY: 2
PAROXYSMAL SWEATS: NO SWEAT VISIBLE
HAVE YOU OR SOMEONE ELSE BEEN INJURED AS A RESULT OF YOUR DRINKING: NO
HOW OFTEN DO YOU HAVE 6 OR MORE DRINKS ON ONE OCCASION: DAILY OR ALMOST DAILY
AGITATION: NORMAL ACTIVITY
ORIENTATION AND CLOUDING OF SENSORIUM: ORIENTED AND CAN DO SERIAL ADDITIONS
PULSE: 105
ORIENTATION AND CLOUDING OF SENSORIUM: ORIENTED AND CAN DO SERIAL ADDITIONS
HEADACHE, FULLNESS IN HEAD: NOT PRESENT
PULSE: 113
TOTAL SCORE: 1
PULSE: 136
TOTAL SCORE: 6
PAROXYSMAL SWEATS: 2
PULSE: 109
TREMOR: 2
ORIENTATION AND CLOUDING OF SENSORIUM: ORIENTED AND CAN DO SERIAL ADDITIONS
NAUSEA AND VOMITING: NO NAUSEA AND NO VOMITING
AUDITORY DISTURBANCES: NOT PRESENT
PULSE: 99
AUDITORY DISTURBANCES: NOT PRESENT
TOTAL SCORE: 2
HEADACHE, FULLNESS IN HEAD: NOT PRESENT
TREMOR: NO TREMOR
ORIENTATION AND CLOUDING OF SENSORIUM: ORIENTED AND CAN DO SERIAL ADDITIONS
PULSE: 105
VISUAL DISTURBANCES: NOT PRESENT
TOTAL SCORE: 4
NAUSEA AND VOMITING: NO NAUSEA AND NO VOMITING
TOTAL SCORE: 1
VISUAL DISTURBANCES: NOT PRESENT
VISUAL DISTURBANCES: NOT PRESENT
HEADACHE, FULLNESS IN HEAD: NOT PRESENT
HEADACHE, FULLNESS IN HEAD: NOT PRESENT
HAS A RELATIVE, FRIEND, DOCTOR, OR ANOTHER HEALTH PROFESSIONAL EXPRESSED CONCERN ABOUT YOUR DRINKING OR SUGGESTED YOU CUT DOWN: YES, BUT NOT IN THE LAST YEAR
ANXIETY: NO ANXIETY, AT EASE
TREMOR: NOT VISIBLE, BUT CAN BE FELT FINGERTIP TO FINGERTIP
ANXIETY: NO ANXIETY, AT EASE
TOTAL SCORE: 1
ANXIETY: MILDLY ANXIOUS
VISUAL DISTURBANCES: NOT PRESENT
HEADACHE, FULLNESS IN HEAD: MILD
ORIENTATION AND CLOUDING OF SENSORIUM: ORIENTED AND CAN DO SERIAL ADDITIONS
PAROXYSMAL SWEATS: BARELY PERCEPTIBLE SWEATING, PALMS MOIST
VISUAL DISTURBANCES: NOT PRESENT
NAUSEA AND VOMITING: NO NAUSEA AND NO VOMITING
HOW MANY STANDARD DRINKS CONTAINING ALCOHOL DO YOU HAVE ON A TYPICAL DAY: 10 OR MORE
AGITATION: NORMAL ACTIVITY
BLOOD PRESSURE: 136/80
HEADACHE, FULLNESS IN HEAD: NOT PRESENT
ANXIETY: NO ANXIETY, AT EASE
HOW OFTEN DURING THE LAST YEAR HAVE YOU BEEN UNABLE TO REMEMBER WHAT HAPPENED THE NIGHT BEFORE BECAUSE YOU HAD BEEN DRINKING: LESS THAN MONTHLY
AUDIT TOTAL SCORE: 17
VISUAL DISTURBANCES: NOT PRESENT
AUDIT-C TOTAL SCORE: 12
TOTAL SCORE: 3
HOW OFTEN DURING THE LAST YEAR HAVE YOU NEEDED AN ALCOHOLIC DRINK FIRST THING IN THE MORNING TO GET YOURSELF GOING AFTER A NIGHT OF HEAVY DRINKING: LESS THAN MONTHLY
ORIENTATION AND CLOUDING OF SENSORIUM: ORIENTED AND CAN DO SERIAL ADDITIONS
TOTAL SCORE: 3
TREMOR: 2
NAUSEA AND VOMITING: NO NAUSEA AND NO VOMITING
AGITATION: NORMAL ACTIVITY
TREMOR: 2
AGITATION: NORMAL ACTIVITY
HOW OFTEN DURING THE LAST YEAR HAVE YOU FOUND THAT YOU WERE NOT ABLE TO STOP DRINKING ONCE YOU HAD STARTED: NEVER
PAROXYSMAL SWEATS: NO SWEAT VISIBLE
AUDITORY DISTURBANCES: NOT PRESENT
NAUSEA AND VOMITING: NO NAUSEA AND NO VOMITING
HOW OFTEN DURING THE LAST YEAR HAVE YOU HAD A FEELING OF GUILT OR REMORSE AFTER DRINKING: NEVER
AUDITORY DISTURBANCES: NOT PRESENT
AUDITORY DISTURBANCES: NOT PRESENT
PAROXYSMAL SWEATS: 2
AUDITORY DISTURBANCES: NOT PRESENT
PAROXYSMAL SWEATS: NO SWEAT VISIBLE
TREMOR: NOT VISIBLE, BUT CAN BE FELT FINGERTIP TO FINGERTIP
VISUAL DISTURBANCES: NOT PRESENT
ANXIETY: NO ANXIETY, AT EASE
AUDIT TOTAL SCORE: 5
AGITATION: NORMAL ACTIVITY
SKIP TO QUESTIONS 9-10: 0
AGITATION: NORMAL ACTIVITY
PAROXYSMAL SWEATS: NO SWEAT VISIBLE
NAUSEA AND VOMITING: NO NAUSEA AND NO VOMITING
VISUAL DISTURBANCES: NOT PRESENT
ORIENTATION AND CLOUDING OF SENSORIUM: ORIENTED AND CAN DO SERIAL ADDITIONS

## 2024-12-31 ASSESSMENT — PATIENT HEALTH QUESTIONNAIRE - PHQ9
2. FEELING DOWN, DEPRESSED OR HOPELESS: SEVERAL DAYS
SUM OF ALL RESPONSES TO PHQ9 QUESTIONS 1 & 2: 2
1. LITTLE INTEREST OR PLEASURE IN DOING THINGS: SEVERAL DAYS

## 2024-12-31 ASSESSMENT — COGNITIVE AND FUNCTIONAL STATUS - GENERAL
DAILY ACTIVITIY SCORE: 24
MOBILITY SCORE: 24
CLIMB 3 TO 5 STEPS WITH RAILING: A LITTLE
MOBILITY SCORE: 23
PATIENT BASELINE BEDBOUND: NO
DAILY ACTIVITIY SCORE: 24

## 2024-12-31 ASSESSMENT — ACTIVITIES OF DAILY LIVING (ADL)
JUDGMENT_ADEQUATE_SAFELY_COMPLETE_DAILY_ACTIVITIES: NO
LACK_OF_TRANSPORTATION: NO
LACK_OF_TRANSPORTATION: NO
PATIENT'S MEMORY ADEQUATE TO SAFELY COMPLETE DAILY ACTIVITIES?: YES
TOILETING: INDEPENDENT
GROOMING: INDEPENDENT
WALKS IN HOME: INDEPENDENT
HEARING - LEFT EAR: FUNCTIONAL
HEARING - RIGHT EAR: FUNCTIONAL
DRESSING YOURSELF: INDEPENDENT
ADEQUATE_TO_COMPLETE_ADL: YES
BATHING: INDEPENDENT
LACK_OF_TRANSPORTATION: NO
FEEDING YOURSELF: INDEPENDENT

## 2024-12-31 ASSESSMENT — PAIN - FUNCTIONAL ASSESSMENT
PAIN_FUNCTIONAL_ASSESSMENT: 0-10

## 2024-12-31 ASSESSMENT — PAIN SCALES - GENERAL
PAINLEVEL_OUTOF10: 0 - NO PAIN

## 2024-12-31 NOTE — CONSULTS
"Nutrition Initial Assessment:   Nutrition Assessment    Reason for Assessment: Admission nursing screening    Patient is a 34 y.o. male on day 0presenting with Alcohol abuse.    PMH:  anxiety and depression       Nutrition History:  Energy Intake: Fair 50-75 %  Food and Nutrient History: pt reports good po intake at baseline. pt reports eating 3 meals per day and snacks. over the last week, pt has decreased po intake d/t n/v and depression. pt currently going through divorce. pt reports good po intake today and decrease in n/v.  Food Allergies/Intolerances:  None  GI Symptoms: None  Oral Problems: None   .     Wound Type: intact (nursing/wound notes provide further details)  Anthropometrics:  Height: 170.7 cm (5' 7.21\")   Weight: 58.7 kg (129 lb 6.6 oz)   BMI (Calculated): 20.15             Weight History:   Wt Readings from Last 10 Encounters:   12/31/24 58.7 kg (129 lb 6.6 oz)   03/10/24 59 kg (130 lb)   09/27/23 55.5 kg (122 lb 4 oz)       Weight Change %:  Weight History / % Weight Change: pt with a 8lb weight loss in the last week per pt. pt reports weighing himself 1/week. reports ubw of 137lbs approx. 1 week ago  Significant Weight Loss: Yes  Interpretation of Weight Loss: >2% in 1 week    Nutrition Focused Physical Exam Findings:  Subcutaneous Fat Loss:   Orbital Fat Pads: Well nourished (slightly bulging fat pads)  Buccal Fat Pads: Well nourished (full, rounded cheeks)  Triceps: Well nourished (ample fat tissue)  Ribs: Defer  Muscle Wasting:  Temporalis: Well nourished (well-defined muscle)  Pectoralis (Clavicular Region): Mild-Moderate (some protrusion of clavicle)  Deltoid/Trapezius: Defer  Interosseous: Defer  Trapezius/Infraspinatus/Supraspinatus (Scapular Region): Defer  Quadriceps: Defer  Gastrocnemius: Defer  Edema:    No edema   Physical Findings:       Nutrition Significant Labs:  CBC Trend:   Results from last 7 days   Lab Units 12/30/24  2134   WBC AUTO x10*3/uL 22.2*   RBC AUTO x10*6/uL 4.41* "   HEMOGLOBIN g/dL 13.6   HEMATOCRIT % 36.4*   MCV fL 83   PLATELETS AUTO x10*3/uL 244    , BMP Trend:   Results from last 7 days   Lab Units 12/31/24  1152 12/31/24  0633 12/30/24  2134   GLUCOSE mg/dL 97 93 113*   CALCIUM mg/dL 8.6 8.6 8.3*   SODIUM mmol/L 133* 131* 114*   POTASSIUM mmol/L 3.7 3.9 3.4*   CO2 mmol/L 27 27 23   CHLORIDE mmol/L 99 94* 77*   BUN mg/dL 7 7 8   CREATININE mg/dL 0.51 0.52 0.52       Nutrition Specific Medications:  escitalopram, 5 mg, oral, Daily  folic acid, 1 mg, oral, Daily  multivitamin with minerals, 1 tablet, oral, Daily  nicotine, 1 patch, transdermal, Daily  [START ON 1/3/2025] thiamine, 100 mg, oral, Daily  thiamine, 100 mg, intravenous, Daily          I/O:   Last BM Date: 12/31/24; Stool Appearance: Unable to assess (12/31/24 0900)    Dietary Orders (From admission, onward)       Start     Ordered    12/31/24 0624  Adult diet Regular  Diet effective now        Question:  Diet type  Answer:  Regular    12/31/24 0624 12/31/24 0109  May Participate in Room Service  ( ROOM SERVICE MAY PARTICIPATE)  Once        Question:  .  Answer:  Yes    12/31/24 0108                     Estimated Needs:   Total Energy Estimated Needs (kCal): 1500 kCal  Method for Estimating Needs: MSJ  Total Protein Estimated Needs (g): 58 g  Method for Estimating Needs: 1ml/kcal  Total Fluid Estimated Needs (mL): 1500 mL  Method for Estimating Needs: 1ml/kcal        Nutrition Diagnosis   Malnutrition Diagnosis  Patient has Malnutrition Diagnosis: Yes  Diagnosis Status: New  Malnutrition Diagnosis: Severe malnutrition related to starvation  As Evidenced by: >2% weight loss in one week, <50% of po intake >5 days            Nutrition Interventions/Recommendations         Nutrition Prescription:  Individualized Nutrition Prescription Provided for : prescribed diet to provide adequate calories and protein        Nutrition Interventions:   Interventions: Meals and snacks  Meals and Snacks: General healthful  diet    Collaboration and Referral of Nutrition Care: Collaboration by nutrition professional with other providers  Goal: Attend Care rounds    Nutrition Education:     Education Documentation  No documentation found.        Nutrition Monitoring and Evaluation   Food/Nutrient Related History Monitoring  Monitoring and Evaluation Plan: Amount of food  Amount of Food: Estimated amout of food, Medical food intake  Criteria: Pt will consume and tolerate % of est needs    Body Composition/Growth/Weight History  Monitoring and Evaluation Plan: Weight  Criteria: maintain stable weights         Nutrition Focused Physical Findings  Monitoring and Evaluation Plan: Adipose, Muscles, Skin  Adipose: Loss of subcutaneous fat  Criteria: maintain adipose stores  Muscles: Muscle atrophy  Criteria: maintain lean muscle mass  Other: Evaluated nutrition intervention as compared to nutrition goal(s) and estimated nutrient need criteria.       Follow Up:     Time Spent (min): 60 minutes  Last Date of Nutrition Visit: 12/31/24  Nutrition Follow-Up Needed?: Dietitian to reassess per policy  Follow up Comment: 1/3  Time Spent (min): 60 minutes      12/31/24 at 1:04 PM - JUDIE VALDERRAMA RDN, THELMA

## 2024-12-31 NOTE — H&P
History Of Present Illness  Grant Carey is a 34 y.o. male presenting with nausea and seeking detox. Recently relapsed. Wife left him 5 days before [place]. His last drink was in 12/29. He has not eaten in 5 days. Hyponatremia at 114 with confusion. Given 100 mg of 3% hypertonic in ED as well as 2 mg IVP Ativan and 5 mg IVP Compazine.    VSS.    Chief Complaint   Patient presents with    Anxiety          HPI obtained from pt.   Notes from ED physician and nurse reviewed. Case discussed with attending ED physician.     Past Medical History  Past Medical History:   Diagnosis Date    Anxiety     Depression         Surgical History  Past Surgical History:   Procedure Laterality Date    CT ANGIO NECK W AND WO IV CONTRAST  07/16/2022    CT NECK ANGIO W AND WO IV CONTRAST 7/16/2022 [facility] EMERGENCY LEGACY    CT HEAD ANGIO W AND WO IV CONTRAST  07/16/2022    CT HEAD ANGIO W AND WO IV CONTRAST 7/16/2022 [facility] EMERGENCY LEGACY    VASECTOMY        Recent Surgeries in Hospitalist            No cases to display           Past Surgical History:   Procedure Laterality Date    CT ANGIO NECK W AND WO IV CONTRAST  07/16/2022    CT NECK ANGIO W AND WO IV CONTRAST 7/16/2022 [facility] EMERGENCY LEGACY    CT HEAD ANGIO W AND WO IV CONTRAST  07/16/2022    CT HEAD ANGIO W AND WO IV CONTRAST 7/16/2022 [facility] EMERGENCY LEGACY    VASECTOMY           Social History  Social History     Substance and Sexual Activity   Alcohol Use Yes    Alcohol/week: 2.0 standard drinks of alcohol    Types: 2 Cans of beer per week      Social History     Substance and Sexual Activity   Drug Use Never      Social History     Substance and Sexual Activity   Sexual Activity Not on file      Social History     Tobacco Use   Smoking Status Never   Smokeless Tobacco Never      Food Insecurity: No Food Insecurity (12/31/2024)    Hunger Vital Sign     Worried About Running Out of Food in the Last Year: Never true     Ran Out of Food in the Last Year: Never true      Financial  Resource Strain: Low Risk  (12/31/2024)    Overall Financial Resource Strain (CARDIA)     Difficulty of Paying Living Expenses: Not hard at all      Intimate Partner Violence: Not At Risk (12/31/2024)    Humiliation, Afraid, Rape, and Kick questionnaire     Fear of Current or Ex-Partner: No     Emotionally Abused: No     Physically Abused: No     Sexually Abused: No      Transportation Needs: No Transportation Needs (12/31/2024)    PRAPARE - Transportation     Lack of Transportation (Medical): No     Lack of Transportation (Non-Medical): No        Family History  Family History   Problem Relation Name Age of Onset    Heart disease Paternal Grandmother      Hypertension Paternal Grandmother           Allergies  Allergies   Allergen Reactions    Cat Dander Hives    Roxy Other    Prednisone Other      Allergies   Allergen Reactions    Cat Dander Hives    Roxy Other    Prednisone Other        Review of Systems  Review of Systems   Deferred    Physical Exam   Physical Exam   Constitutional:       General: He is not in acute distress.     Appearance: He is well-developed.   HENT:      Head: Normocephalic and atraumatic.   Eyes:      Conjunctiva/sclera: Conjunctivae normal.   Cardiovascular:      Rate and Rhythm: Normal rate and regular rhythm.      Heart sounds: No murmur heard.  Pulmonary:      Effort: Pulmonary effort is normal. No respiratory distress.      Breath sounds: Normal breath sounds.   Abdominal:      Palpations: Abdomen is soft.      Tenderness: There is no abdominal tenderness.   Musculoskeletal:         General: No swelling.      Cervical back: Neck supple.   Skin:     General: Skin is warm and dry.      Capillary Refill: Capillary refill takes less than 2 seconds.   Neurological:      Mental Status: He is alert. He is disoriented.      Comments: Shaky   Psychiatric:         Mood and Affect: Mood is anxious.         Speech: Speech is delayed.     Last Recorded Vitals  Visit Vitals  /80   Pulse  "109   Temp 36.6 °C (97.9 °F) (Temporal)   Resp 18      Visit Vitals  /80   Pulse 109   Temp 36.6 °C (97.9 °F) (Temporal)   Resp 18   Ht 1.707 m (5' 7.21\")   Wt 58.7 kg (129 lb 6.6 oz)   SpO2 98%   BMI 20.15 kg/m²   Smoking Status Never   BSA 1.67 m²        Relevant Results      Results for orders placed or performed during the hospital encounter of 12/30/24 (from the past 24 hours)   CBC and Auto Differential   Result Value Ref Range    WBC 22.2 (H) 4.4 - 11.3 x10*3/uL    nRBC 0.0 0.0 - 0.0 /100 WBCs    RBC 4.41 (L) 4.50 - 5.90 x10*6/uL    Hemoglobin 13.6 13.5 - 17.5 g/dL    Hematocrit 36.4 (L) 41.0 - 52.0 %    MCV 83 80 - 100 fL    MCH 30.8 26.0 - 34.0 pg    MCHC 37.4 (H) 32.0 - 36.0 g/dL    RDW 11.0 (L) 11.5 - 14.5 %    Platelets 244 150 - 450 x10*3/uL    Neutrophils % 81.3 40.0 - 80.0 %    Immature Granulocytes %, Automated 0.5 0.0 - 0.9 %    Lymphocytes % 10.0 13.0 - 44.0 %    Monocytes % 7.7 2.0 - 10.0 %    Eosinophils % 0.1 0.0 - 6.0 %    Basophils % 0.4 0.0 - 2.0 %    Neutrophils Absolute 18.05 (H) 1.20 - 7.70 x10*3/uL    Immature Granulocytes Absolute, Automated 0.12 0.00 - 0.70 x10*3/uL    Lymphocytes Absolute 2.22 1.20 - 4.80 x10*3/uL    Monocytes Absolute 1.72 (H) 0.10 - 1.00 x10*3/uL    Eosinophils Absolute 0.02 0.00 - 0.70 x10*3/uL    Basophils Absolute 0.08 0.00 - 0.10 x10*3/uL   Comprehensive metabolic panel   Result Value Ref Range    Glucose 113 (H) 74 - 99 mg/dL    Sodium 114 (LL) 136 - 145 mmol/L    Potassium 3.4 (L) 3.5 - 5.3 mmol/L    Chloride 77 (L) 98 - 107 mmol/L    Bicarbonate 23 21 - 32 mmol/L    Anion Gap 17 10 - 20 mmol/L    Urea Nitrogen 8 6 - 23 mg/dL    Creatinine 0.52 0.50 - 1.30 mg/dL    eGFR >90 >60 mL/min/1.73m*2    Calcium 8.3 (L) 8.6 - 10.3 mg/dL    Albumin 4.3 3.4 - 5.0 g/dL    Alkaline Phosphatase 108 33 - 120 U/L    Total Protein 6.6 6.4 - 8.2 g/dL    AST 96 (H) 9 - 39 U/L    Bilirubin, Total 2.5 (H) 0.0 - 1.2 mg/dL    ALT 56 (H) 10 - 52 U/L   Lipase   Result Value Ref " Range    Lipase 21 9 - 82 U/L   Alcohol   Result Value Ref Range    Alcohol <10 <=10 mg/dL   Magnesium   Result Value Ref Range    Magnesium 1.21 (L) 1.60 - 2.40 mg/dL      No results found.     I personally reviewed and interpreted the above results, multiple of which contributed to my medical making decisions.        Assessment/Plan     Alcohol abuse  At risk for withdrawal  Hyponatremia  Hypokalemia  Hypomagnesemia  -ciwa with ativan  -IF fluids  -encourage PO as able  -labs q6h  -zofran for nausea  -ICU with tele    Fluids: /hour,encourage PO  Electrolytes: replace PRN  Nutrition: general  Bowel prophylaxis:   DVT prophylaxis:  n/a          Payal Bui MD

## 2024-12-31 NOTE — SIGNIFICANT EVENT
Patient has a warning sign out AMA all afternoon.  This evening at 1840 he decided to leave finally AGAINST MEDICAL ADVICE.  I went and talked to him and told him the risk of leaving AGAINST MEDICAL ADVICE early in the treatment of his alcohol intoxication and withdrawal.  He verbalized back the risks that he is aware of including worsening condition, worsening alcohol withdrawal and DTs and/or death.  He echoed back theses risks to me.  Patient did sign out AGAINST MEDICAL ADVICE.

## 2024-12-31 NOTE — PROGRESS NOTES
12/31/24 1440   Discharge Planning   Living Arrangements Alone   Support Systems Friends/neighbors   Assistance Needed None   Type of Residence Private residence   Do you have animals or pets at home? No   Who is requesting discharge planning? Provider   Home or Post Acute Services None   Expected Discharge Disposition Home   Does the patient need discharge transport arranged? Yes   Financial Resource Strain   How hard is it for you to pay for the very basics like food, housing, medical care, and heating? Not very   Housing Stability   In the last 12 months, was there a time when you were not able to pay the mortgage or rent on time? N   In the past 12 months, how many times have you moved where you were living? 0   At any time in the past 12 months, were you homeless or living in a shelter (including now)? N   Transportation Needs   In the past 12 months, has lack of transportation kept you from medical appointments or from getting medications? no   In the past 12 months, has lack of transportation kept you from meetings, work, or from getting things needed for daily living? No   Intensity of Service   Intensity of Service 0-30 min     Care Transitions: Met with patient at bedside for initial assessment. Role of TCC explained. Demographics verified. Does not want anyone added to his emergency contact list. PCP is Amanda Conner but states he is looking for a new provider. Denies needing a list of available providers in the area. States he lives alone as of recent and has visitation with his minor daughter. He is independent with all ADL's, is employed and able to drive self. He will need a work excuse when discharged. [facility] he has a history with alcohol abuse but had been sober for approximately 8 months until recent circumstances around [place] that caused him to fall back into consuming alcohol. [facility] he has been going to [facility] in [place] as an outpatient treatment center and has been attending AA  meetings there. He is requesting any outside resource assistance for alcoholism and mental health. Denies any suicidal ideations/thoughts. States he is just dealing with family issues with his wife leaving and is under a lot of mental stress. Discussed case with care team  for follow up. Yolette Miller RN/TCC

## 2024-12-31 NOTE — PROGRESS NOTES
Physical Therapy                 Therapy Communication Note    Patient Name: Grant Carey  MRN: [id number]  Department: [facility] ICU  Room: 336/336-A  Today's Date: 12/31/2024     Discipline: Physical Therapy          Missed Visit Reason: Missed Visit Reason: Other (Comment) (PT order received, chart reviewed.  Bedside nurse  Medina states patient is up in room with SBA. Patient is independent with all mobility and self care at baseline.  No acute PT needs identified at this time.  If status changes please re-consult.)    Missed Time: Attempt    Comment:

## 2024-12-31 NOTE — PROGRESS NOTES
Occupational Therapy                 Therapy Communication Note    Patient Name: Grant Carey  MRN: [id number]  Department: [facility] ICU  Room: 336/336-A  Today's Date: 12/31/2024     Discipline: Occupational Therapy          Missed Visit Reason: Missed Visit Reason: Other (Comment) (OT order received, chart reviewed. Bedside nurse Medina states patient is up in room with SBA. Patient is independent with all mobility and self care at baseline. No acute OT needs identified at this time. If status changes please re-consult.)

## 2025-01-01 ENCOUNTER — HOSPITAL ENCOUNTER (EMERGENCY)
Facility: HOSPITAL | Age: 35
Discharge: LEFT BEFORE DISCHARGE | End: 2025-01-01
Attending: EMERGENCY MEDICINE
Payer: COMMERCIAL

## 2025-01-01 VITALS
SYSTOLIC BLOOD PRESSURE: 154 MMHG | DIASTOLIC BLOOD PRESSURE: 105 MMHG | RESPIRATION RATE: 20 BRPM | OXYGEN SATURATION: 99 % | HEIGHT: 67 IN | WEIGHT: 130 LBS | TEMPERATURE: 98.9 F | HEART RATE: 81 BPM | BODY MASS INDEX: 20.4 KG/M2

## 2025-01-01 DIAGNOSIS — F10.20 ALCOHOLISM (MULTI): ICD-10-CM

## 2025-01-01 DIAGNOSIS — E87.1 HYPONATREMIA SYNDROME: Primary | ICD-10-CM

## 2025-01-01 PROCEDURE — 99281 EMR DPT VST MAYX REQ PHY/QHP: CPT | Performed by: EMERGENCY MEDICINE

## 2025-01-01 RX ORDER — LANOLIN ALCOHOL/MO/W.PET/CERES
100 CREAM (GRAM) TOPICAL ONCE
Status: DISCONTINUED | OUTPATIENT
Start: 2025-01-01 | End: 2025-01-01

## 2025-01-01 RX ORDER — FOLIC ACID 1 MG/1
1 TABLET ORAL ONCE
Status: DISCONTINUED | OUTPATIENT
Start: 2025-01-01 | End: 2025-01-01

## 2025-01-01 RX ORDER — MULTIVIT-MIN/IRON FUM/FOLIC AC 7.5 MG-4
1 TABLET ORAL ONCE
Status: DISCONTINUED | OUTPATIENT
Start: 2025-01-01 | End: 2025-01-01

## 2025-01-01 ASSESSMENT — PAIN SCALES - GENERAL: PAINLEVEL_OUTOF10: 0 - NO PAIN

## 2025-01-01 ASSESSMENT — PAIN - FUNCTIONAL ASSESSMENT: PAIN_FUNCTIONAL_ASSESSMENT: 0-10

## 2025-01-01 NOTE — ED PROVIDER NOTES
"HPI   Chief Complaint   Patient presents with    \"finish treatment from yesterday.\"     Pt to ED with c/o \"I want to finish my treatment from yesterday.\" Pt left AMA. Pt did have 4 beers overnight, last drink at 4am. Pt states \"I feel like my heart is racing and I feel guilty.\" Pt also c/o increase in anxiety, nausea, diaphoresis.        Patient presents to the emergency department requesting to \"finish my treatment\".  The medical record was reviewed and this was noted to contribute directly to patient care.  Patient was admitted 48 hours ago for hyponatremia to the intensive care unit here and signed out AMA shortly after being admitted.  The patient is a known chronic alcoholic and states that he \"drank a few Bud Light's\" yesterday.  In regards to physical symptoms he states that he is experiencing palpitations at this time.      History provided by:  Patient   used: No            Patient History   Past Medical History:   Diagnosis Date    Anxiety     Depression      Past Surgical History:   Procedure Laterality Date    CT ANGIO NECK W AND WO IV CONTRAST  07/16/2022    CT NECK ANGIO W AND WO IV CONTRAST 7/16/2022 [facility] EMERGENCY LEGACY    CT HEAD ANGIO W AND WO IV CONTRAST  07/16/2022    CT HEAD ANGIO W AND WO IV CONTRAST 7/16/2022 [facility] EMERGENCY LEGACY    VASECTOMY       Family History   Problem Relation Name Age of Onset    Heart disease Paternal Grandmother      Hypertension Paternal Grandmother       Social History     Tobacco Use    Smoking status: Never    Smokeless tobacco: Never   Vaping Use    Vaping status: Never Used   Substance Use Topics    Alcohol use: Yes     Alcohol/week: 2.0 standard drinks of alcohol     Types: 2 Cans of beer per week    Drug use: Never       Physical Exam   ED Triage Vitals   Temp Pulse Resp BP   -- -- -- --      SpO2 Temp src Heart Rate Source Patient Position   -- -- -- --      BP Location FiO2 (%)     -- --       Physical Exam  Vitals and nursing note " "reviewed.   Constitutional:       General: He is not in acute distress.     Appearance: Normal appearance. He is normal weight. He is not ill-appearing, toxic-appearing or diaphoretic.      Comments: Patient was able to ambulate back to the treatment area from triage without any gait disturbance or difficulty.   HENT:      Head: Normocephalic and atraumatic.      Nose: Nose normal. No rhinorrhea.   Neck:      Comments: Trachea is midline  Cardiovascular:      Rate and Rhythm: Normal rate and regular rhythm.      Heart sounds: No murmur heard.  Pulmonary:      Effort: Pulmonary effort is normal.      Breath sounds: Normal breath sounds. No wheezing.   Abdominal:      General: Abdomen is flat. Bowel sounds are normal. There is no distension.      Palpations: Abdomen is soft.      Tenderness: There is no abdominal tenderness.   Musculoskeletal:         General: Normal range of motion.      Cervical back: Normal range of motion.   Skin:     General: Skin is warm and dry.      Findings: No rash.   Neurological:      General: No focal deficit present.      Mental Status: He is alert and oriented to person, place, and time. Mental status is at baseline.      Comments: No tremor identified   Psychiatric:         Mood and Affect: Mood normal.         Behavior: Behavior normal.         Thought Content: Thought content normal.         Judgment: Judgment normal.           ED Course & MDM                  No data recorded     Hanna Coma Scale Score: 15 (01/01/25 1128 : Aimee Herring RN)                           Medical Decision Making  Patient ambulated back from triage and I saw him within a minute or 2.  After my initial exam I placed orders for the patient and while the nursing staff was still in the room performing their assessment apparently the patient stated \"this is not right and I just want to leave\".  The patient then got up out of the room and walked out of the department.  According to the nursing staff he refused " discharge papers and refused outpatient alcohol rehabilitation referral.  No treatment was performed during his stay is once again he walked out within minutes of arrival..  It is unknown why the patient decided to come to the hospital and then left within minutes however if he returns at any time we will happily reevaluate him.        Procedure  Procedures     Elliot Morrissey,   01/01/25 1133

## 2025-01-01 NOTE — ED TRIAGE NOTES
"Pt sat up and states \"This isn't right I want to leave, point me to the exit.\" Pt did not want to wait for paperwork and was offered information on a rehab, he states \"No I don't want that.\" Pt ambulated out of room into ED lobby.  "

## 2025-01-02 ENCOUNTER — APPOINTMENT (OUTPATIENT)
Dept: RADIOLOGY | Facility: HOSPITAL | Age: 35
End: 2025-01-02
Payer: COMMERCIAL

## 2025-01-02 ENCOUNTER — HOSPITAL ENCOUNTER (EMERGENCY)
Facility: HOSPITAL | Age: 35
Discharge: HOME | End: 2025-01-02
Attending: EMERGENCY MEDICINE
Payer: COMMERCIAL

## 2025-01-02 ENCOUNTER — APPOINTMENT (OUTPATIENT)
Dept: CARDIOLOGY | Facility: HOSPITAL | Age: 35
End: 2025-01-02
Payer: COMMERCIAL

## 2025-01-02 VITALS
TEMPERATURE: 97.8 F | SYSTOLIC BLOOD PRESSURE: 149 MMHG | BODY MASS INDEX: 21.03 KG/M2 | DIASTOLIC BLOOD PRESSURE: 104 MMHG | HEART RATE: 84 BPM | RESPIRATION RATE: 16 BRPM | WEIGHT: 134 LBS | HEIGHT: 67 IN | OXYGEN SATURATION: 98 %

## 2025-01-02 DIAGNOSIS — R53.1 WEAKNESS: Primary | ICD-10-CM

## 2025-01-02 DIAGNOSIS — E87.6 HYPOKALEMIA: ICD-10-CM

## 2025-01-02 DIAGNOSIS — G47.00 INSOMNIA, UNSPECIFIED TYPE: ICD-10-CM

## 2025-01-02 DIAGNOSIS — E87.1 HYPONATREMIA: ICD-10-CM

## 2025-01-02 LAB
ALBUMIN SERPL BCP-MCNC: 4.3 G/DL (ref 3.4–5)
ALP SERPL-CCNC: 108 U/L (ref 33–120)
ALT SERPL W P-5'-P-CCNC: 67 U/L (ref 10–52)
AMPHETAMINES UR QL SCN: NORMAL
ANION GAP SERPL CALC-SCNC: 14 MMOL/L (ref 10–20)
APPEARANCE UR: CLEAR
AST SERPL W P-5'-P-CCNC: 66 U/L (ref 9–39)
BARBITURATES UR QL SCN: NORMAL
BASOPHILS # BLD AUTO: 0.06 X10*3/UL (ref 0–0.1)
BASOPHILS NFR BLD AUTO: 0.7 %
BENZODIAZ UR QL SCN: NORMAL
BILIRUB SERPL-MCNC: 1.1 MG/DL (ref 0–1.2)
BILIRUB UR STRIP.AUTO-MCNC: NEGATIVE MG/DL
BUN SERPL-MCNC: 4 MG/DL (ref 6–23)
BZE UR QL SCN: NORMAL
CALCIUM SERPL-MCNC: 8.9 MG/DL (ref 8.6–10.3)
CANNABINOIDS UR QL SCN: NORMAL
CARDIAC TROPONIN I PNL SERPL HS: 4 NG/L (ref 0–20)
CARDIAC TROPONIN I PNL SERPL HS: 5 NG/L (ref 0–20)
CHLORIDE SERPL-SCNC: 92 MMOL/L (ref 98–107)
CO2 SERPL-SCNC: 23 MMOL/L (ref 21–32)
COLOR UR: COLORLESS
CREAT SERPL-MCNC: 0.44 MG/DL (ref 0.5–1.3)
EGFRCR SERPLBLD CKD-EPI 2021: >90 ML/MIN/1.73M*2
EOSINOPHIL # BLD AUTO: 0.07 X10*3/UL (ref 0–0.7)
EOSINOPHIL NFR BLD AUTO: 0.8 %
ERYTHROCYTE [DISTWIDTH] IN BLOOD BY AUTOMATED COUNT: 11.5 % (ref 11.5–14.5)
ETHANOL SERPL-MCNC: <10 MG/DL
FENTANYL+NORFENTANYL UR QL SCN: NORMAL
FLUAV RNA RESP QL NAA+PROBE: NOT DETECTED
FLUBV RNA RESP QL NAA+PROBE: NOT DETECTED
GLUCOSE SERPL-MCNC: 96 MG/DL (ref 74–99)
GLUCOSE UR STRIP.AUTO-MCNC: NORMAL MG/DL
HCT VFR BLD AUTO: 39.1 % (ref 41–52)
HGB BLD-MCNC: 14.3 G/DL (ref 13.5–17.5)
HOLD SPECIMEN: NORMAL
IMM GRANULOCYTES # BLD AUTO: 0.05 X10*3/UL (ref 0–0.7)
IMM GRANULOCYTES NFR BLD AUTO: 0.6 % (ref 0–0.9)
KETONES UR STRIP.AUTO-MCNC: NEGATIVE MG/DL
LEUKOCYTE ESTERASE UR QL STRIP.AUTO: NEGATIVE
LYMPHOCYTES # BLD AUTO: 1.75 X10*3/UL (ref 1.2–4.8)
LYMPHOCYTES NFR BLD AUTO: 19.8 %
MAGNESIUM SERPL-MCNC: 1.75 MG/DL (ref 1.6–2.4)
MCH RBC QN AUTO: 30.7 PG (ref 26–34)
MCHC RBC AUTO-ENTMCNC: 36.6 G/DL (ref 32–36)
MCV RBC AUTO: 84 FL (ref 80–100)
METHADONE UR QL SCN: NORMAL
MONOCYTES # BLD AUTO: 0.77 X10*3/UL (ref 0.1–1)
MONOCYTES NFR BLD AUTO: 8.7 %
NEUTROPHILS # BLD AUTO: 6.15 X10*3/UL (ref 1.2–7.7)
NEUTROPHILS NFR BLD AUTO: 69.4 %
NITRITE UR QL STRIP.AUTO: NEGATIVE
NRBC BLD-RTO: 0 /100 WBCS (ref 0–0)
OPIATES UR QL SCN: NORMAL
OXYCODONE+OXYMORPHONE UR QL SCN: NORMAL
PCP UR QL SCN: NORMAL
PH UR STRIP.AUTO: 6.5 [PH]
PLATELET # BLD AUTO: 163 X10*3/UL (ref 150–450)
POTASSIUM SERPL-SCNC: 3.1 MMOL/L (ref 3.5–5.3)
PROT SERPL-MCNC: 6.8 G/DL (ref 6.4–8.2)
PROT UR STRIP.AUTO-MCNC: NEGATIVE MG/DL
RBC # BLD AUTO: 4.66 X10*6/UL (ref 4.5–5.9)
RBC # UR STRIP.AUTO: NEGATIVE /UL
SARS-COV-2 RNA RESP QL NAA+PROBE: NOT DETECTED
SODIUM SERPL-SCNC: 126 MMOL/L (ref 136–145)
SP GR UR STRIP.AUTO: 1
UROBILINOGEN UR STRIP.AUTO-MCNC: NORMAL MG/DL
WBC # BLD AUTO: 8.9 X10*3/UL (ref 4.4–11.3)

## 2025-01-02 PROCEDURE — 87636 SARSCOV2 & INF A&B AMP PRB: CPT | Performed by: EMERGENCY MEDICINE

## 2025-01-02 PROCEDURE — 80053 COMPREHEN METABOLIC PANEL: CPT | Performed by: EMERGENCY MEDICINE

## 2025-01-02 PROCEDURE — 85025 COMPLETE CBC W/AUTO DIFF WBC: CPT | Performed by: EMERGENCY MEDICINE

## 2025-01-02 PROCEDURE — 80307 DRUG TEST PRSMV CHEM ANLYZR: CPT | Performed by: EMERGENCY MEDICINE

## 2025-01-02 PROCEDURE — 36415 COLL VENOUS BLD VENIPUNCTURE: CPT | Performed by: EMERGENCY MEDICINE

## 2025-01-02 PROCEDURE — 81003 URINALYSIS AUTO W/O SCOPE: CPT | Mod: 59 | Performed by: EMERGENCY MEDICINE

## 2025-01-02 PROCEDURE — 71045 X-RAY EXAM CHEST 1 VIEW: CPT

## 2025-01-02 PROCEDURE — 82077 ASSAY SPEC XCP UR&BREATH IA: CPT | Performed by: EMERGENCY MEDICINE

## 2025-01-02 PROCEDURE — 99285 EMERGENCY DEPT VISIT HI MDM: CPT | Performed by: EMERGENCY MEDICINE

## 2025-01-02 PROCEDURE — 84484 ASSAY OF TROPONIN QUANT: CPT | Performed by: EMERGENCY MEDICINE

## 2025-01-02 PROCEDURE — 83735 ASSAY OF MAGNESIUM: CPT | Performed by: EMERGENCY MEDICINE

## 2025-01-02 PROCEDURE — 71045 X-RAY EXAM CHEST 1 VIEW: CPT | Performed by: RADIOLOGY

## 2025-01-02 PROCEDURE — 93005 ELECTROCARDIOGRAM TRACING: CPT

## 2025-01-02 PROCEDURE — 2500000002 HC RX 250 W HCPCS SELF ADMINISTERED DRUGS (ALT 637 FOR MEDICARE OP, ALT 636 FOR OP/ED): Performed by: EMERGENCY MEDICINE

## 2025-01-02 RX ORDER — TRAZODONE HYDROCHLORIDE 50 MG/1
50 TABLET ORAL NIGHTLY
Qty: 7 TABLET | Refills: 0 | Status: SHIPPED | OUTPATIENT
Start: 2025-01-02 | End: 2025-01-09

## 2025-01-02 RX ORDER — POTASSIUM CHLORIDE 20 MEQ/1
40 TABLET, EXTENDED RELEASE ORAL ONCE
Status: COMPLETED | OUTPATIENT
Start: 2025-01-02 | End: 2025-01-02

## 2025-01-02 RX ADMIN — POTASSIUM CHLORIDE 40 MEQ: 1500 TABLET, EXTENDED RELEASE ORAL at 08:48

## 2025-01-02 ASSESSMENT — COLUMBIA-SUICIDE SEVERITY RATING SCALE - C-SSRS
2. HAVE YOU ACTUALLY HAD ANY THOUGHTS OF KILLING YOURSELF?: NO
1. IN THE PAST MONTH, HAVE YOU WISHED YOU WERE DEAD OR WISHED YOU COULD GO TO SLEEP AND NOT WAKE UP?: NO
6. HAVE YOU EVER DONE ANYTHING, STARTED TO DO ANYTHING, OR PREPARED TO DO ANYTHING TO END YOUR LIFE?: NO

## 2025-01-02 ASSESSMENT — PAIN - FUNCTIONAL ASSESSMENT: PAIN_FUNCTIONAL_ASSESSMENT: 0-10

## 2025-01-02 ASSESSMENT — PAIN SCALES - GENERAL: PAINLEVEL_OUTOF10: 0 - NO PAIN

## 2025-01-02 NOTE — ED PROVIDER NOTES
HPI   No chief complaint on file.      Limitations to History: None    HPI: 34-year-old male presents with concern for weakness.  Patient states he was seen here 2 days ago and left prior to finishing his treatment.  States he is having difficulty sleeping.  States been present over the past 3 to 4 days.  Denies any chest pain, shortness of breath, nausea, vomiting, urinary symptoms.  Slight right-sided abdominal pain which is since resolved.  Denies any fall or trauma.    Additional History Obtained from: EMS    ------------------------------------------------------------------------------------------------------------------------------------------  Physical Exam:    VS: As documented in the triage note and EMR flowsheet from this visit were reviewed.    Appearance: Alert. cooperative,  in no acute distress.   Skin: Intact,  dry skin, no lesions, rash, petechiae or purpura.   Eyes: PERRLA, EOMs intact,  Conjunctiva pink with no redness or exudates.   HENT: Normocephalic, atraumatic. Nares patent. No intraoral lesions.   Neck: Supple, without meningismus. Trachea at midline. No lymphadenopathy.  Pulmonary: Clear bilaterally with good chest wall excursion. No rales, rhonchi or wheezing. No accessory muscle use or stridor.  Cardiac: Regular rate and rhythm, no rubs, murmurs, or gallops.   Abdomen: Abdomen is soft, nontender, and nondistended.  No palpable organomegaly.  No rebound or guarding.  No CVA tenderness. Nonsurgical abdomen.  Genitourinary: Exam deferred.  Musculoskeletal: Full range of motion.  Pulses full and equal. No cyanosis, clubbing, or edema.  Neurological:  Cranial nerves are grossly intact, grossly normal sensation, no weakness, no focal findings identified.  Psychiatric: Appropriate mood and affect.                Patient History   Past Medical History:   Diagnosis Date    Anxiety     Depression      Past Surgical History:   Procedure Laterality Date    CT ANGIO NECK  07/16/2022    CT NECK ANGIO W  AND WO IV CONTRAST 7/16/2022 [facility] EMERGENCY LEGACY    CT HEAD ANGIO W AND WO IV CONTRAST  07/16/2022    CT HEAD ANGIO W AND WO IV CONTRAST 7/16/2022 [facility] EMERGENCY LEGACY    VASECTOMY       Family History   Problem Relation Name Age of Onset    Heart disease Paternal Grandmother      Hypertension Paternal Grandmother       Social History     Tobacco Use    Smoking status: Never    Smokeless tobacco: Never   Vaping Use    Vaping status: Never Used   Substance Use Topics    Alcohol use: Yes     Alcohol/week: 4.0 standard drinks of alcohol     Types: 4 Cans of beer per week    Drug use: Never       Physical Exam   ED Triage Vitals   Temp Pulse Resp BP   -- -- -- --      SpO2 Temp src Heart Rate Source Patient Position   -- -- -- --      BP Location FiO2 (%)     -- --       Physical Exam      ED Course & MDM   Diagnoses as of 01/02/25 0849   Weakness   Hypokalemia   Hyponatremia   Insomnia, unspecified type                 No data recorded                                 Medical Decision Making  Labs Reviewed  CBC WITH AUTO DIFFERENTIAL - Abnormal     WBC                           8.9                    nRBC                          0.0                    RBC                           4.66                   Hemoglobin                    14.3                   Hematocrit                    39.1 (*)               MCV                           84                     MCH                           30.7                   MCHC                          36.6 (*)               RDW                           11.5                   Platelets                     163                    Neutrophils %                 69.4                   Immature Granulocytes %, Automated   0.6                    Lymphocytes %                 19.8                   Monocytes %                   8.7                    Eosinophils %                 0.8                    Basophils %                   0.7                    Neutrophils Absolute           6.15                   Immature Granulocytes Absolute, Au*   0.05                   Lymphocytes Absolute          1.75                   Monocytes Absolute            0.77                   Eosinophils Absolute          0.07                   Basophils Absolute            0.06                COMPREHENSIVE METABOLIC PANEL - Abnormal     Glucose                       96                     Sodium                        126 (*)                Potassium                     3.1 (*)                Chloride                      92 (*)                 Bicarbonate                   23                     Anion Gap                     14                     Urea Nitrogen                 4 (*)                  Creatinine                    0.44 (*)               eGFR                          >90                    Calcium                       8.9                    Albumin                       4.3                    Alkaline Phosphatase          108                    Total Protein                 6.8                    AST                           66 (*)                 Bilirubin, Total              1.1                    ALT                           67 (*)              URINALYSIS WITH REFLEX CULTURE AND MICROSCOPIC - Abnormal     Color, Urine                  Colorless (*)               Appearance, Urine             Clear                  Specific Gravity, Urine       1.002 (*)               pH, Urine                     6.5                    Protein, Urine                NEGATIVE                Glucose, Urine                Normal                 Blood, Urine                  NEGATIVE                Ketones, Urine                NEGATIVE                Bilirubin, Urine              NEGATIVE                Urobilinogen, Urine           Normal                 Nitrite, Urine                NEGATIVE                Leukocyte Esterase, Urine     NEGATIVE             MAGNESIUM - Normal     Magnesium                     1.75                 SARS-COV-2 PCR - Normal     Coronavirus 2019, PCR                                    Narrative: This assay has received FDA Emergency Use Authorization (EUA) and is only authorized for the duration of time that circumstances exist to justify the authorization of the emergency use of in vitro diagnostic tests for the detection of SARS-CoV-2 virus and/or diagnosis of COVID-19 infection under section 564(b)(1) of the Act, 21 U.S.C. 360bbb-3(b)(1). This assay is an in vitro diagnostic nucleic acid amplification test for the qualitative detection of SARS-CoV-2 from nasopharyngeal specimens and has been validated for use at [facility]. Negative results do not preclude COVID-19 infections and should not be used as the sole basis for diagnosis, treatment, or other management decisions.                    INFLUENZA A AND B PCR - Normal     Flu A Result                                         Flu B Result                                             Narrative: This assay is an in vitro diagnostic multiplex nucleic acid amplification test for the detection and discrimination of Influenza A & B from nasopharyngeal specimens, and has been validated for use at [facility]. Negative results do not preclude Influenza A/B infections, and should not be used as the sole basis for diagnosis, treatment, or other management decisions. If Influenza A/B and RSV PCR results are negative, testing for Parainfluenza virus, Adenovirus and Metapneumovirus is routinely performed for [facility] pediatric oncology and intensive care inpatients, and is available on other patients by placing an add-on request.  SERIAL TROPONIN-INITIAL - Normal     Troponin I, High Sensitivity   5                          Narrative: Less than 99th percentile of normal range cutoff-                  Female and children under 18 years old <14 ng/L; Male <21 ng/L: Negative                  Repeat testing should be  performed if clinically indicated.                                     Female and children under 18 years old 14-50 ng/L; Male 21-50 ng/L:                  Consistent with possible cardiac damage and possible increased clinical                   risk. Serial measurements may help to assess extent of myocardial damage.                                     >50 ng/L: Consistent with cardiac damage, increased clinical risk and                  myocardial infarction. Serial measurements may help assess extent of                   myocardial damage.                                      NOTE: Children less than 1 year old may have higher baseline troponin                   levels and results should be interpreted in conjunction with the overall                   clinical context.                                     NOTE: Troponin I testing is performed using a different                   testing methodology at [facility] [place] than at other                   [facility]. Direct result comparisons should only                   be made within the same method.  ALCOHOL - Normal     Alcohol                       <10                 DRUG SCREEN,URINE - Normal     Amphetamine Screen, Urine                            Barbiturate Screen, Urine                            Benzodiazepines Screen, Urine                          Cannabinoid Screen, Urine                            Cocaine Metabolite Screen, Urine                          Fentanyl Screen, Urine                               Opiate Screen, Urine                                 Oxycodone Screen, Urine                              PCP Screen, Urine                                    Methadone Screen, Urine                                  Narrative: Drug screen results are presumptive and should not be used to assess                   compliance with prescribed medication. Contact the performing [facility] laboratory                   to add-on definitive  confirmatory testing if clinically indicated.                                    Toxicology screening results are reported qualitatively. The concentration must                   be greater than or equal to the cutoff to be reported as positive. The concentration                   at which the screening test can detect an individual drug or metabolite varies.                   The absence of expected drug(s) and/or drug metabolite(s) may indicate non-compliance,                   inappropriate timing of specimen collection relative to drug administration, poor drug                   absorption, diluted/adulterated urine, or limitations of testing. For medical purposes                   only; not valid for forensic use.                                    Interpretive questions should be directed to the laboratory medical directors.  URINALYSIS WITH REFLEX CULTURE AND MICROSCOPIC         Narrative: The following orders were created for panel order Urinalysis with Reflex Culture and Microscopic.                  Procedure                               Abnormality         Status                                     ---------                               -----------         ------                                     Urinalysis with Reflex C...[[id number]]  Abnormal            Final result                               Extra Urine Gray Tube[[id number]]                            In process                                                   Please view results for these tests on the individual orders.  TROPONIN SERIES- (INITIAL, 1 HR)         Narrative: The following orders were created for panel order Troponin I Series, High Sensitivity (0, 1 HR).                  Procedure                               Abnormality         Status                                     ---------                               -----------         ------                                     Troponin I, High Sensiti...[[id number]]  Normal               Final result                               Troponin, High Sensitivi...[[id number]]                      In process                                                   Please view results for these tests on the individual orders.  EXTRA URINE GRAY TUBE  SERIAL TROPONIN, 1 HOUR  XR chest 1 view   Final Result    Normal chest.                MACRO:    None          Signed by: Bernardo Flores 1/2/2025 7:53 AM    Dictation workstation:   [id number]     Medical Decision Making:    Patient appears well nontoxic.  Hypertensive upon arrival.  No tachycardia, diaphoresis, tremors indicative of alcohol withdrawal.  Sodium improved to 126.  Potassium 3.1 which was replaced orally.  Patient is concerned about his insomnia and will be written a short course of trazodone to help him sleep.  Advised on continued oral hydration and refraining from alcohol use.  Advised on follow-up with primary care and asked to return for new or worsening symptoms.  Stable at time of discharge.    Differential Diagnoses Considered: Electrolyte abnormality, volume depletion, viral illness    Independent Interpretation of Studies:  I independently interpreted: Chest x-ray shows no evidence of pneumonia or pneumothorax.    Escalation of Care:  Appropriate for discharge and follow-up with primary care.    Prescription Drug Consideration: Oral trazodone.          Procedure  ECG 12 lead    Performed by: Sridhar Maynard DO  Authorized by: Sridhar Maynard DO    ECG interpreted by ED Physician in the absence of a cardiologist: yes    Comments:      EKG interpreted by Dr. Sridhar Maynard: Normal sinus rhythm at 86 bpm.  KY interval 130 ms.  QTc of 461 ms.       Sridhar Maynard DO  01/02/25 0850

## 2025-01-03 LAB
ATRIAL RATE: 86 BPM
P AXIS: 73 DEGREES
P OFFSET: 198 MS
P ONSET: 152 MS
PR INTERVAL: 130 MS
Q ONSET: 217 MS
QRS COUNT: 14 BEATS
QRS DURATION: 96 MS
QT INTERVAL: 386 MS
QTC CALCULATION(BAZETT): 461 MS
QTC FREDERICIA: 435 MS
R AXIS: 39 DEGREES
T AXIS: 51 DEGREES
T OFFSET: 410 MS
VENTRICULAR RATE: 86 BPM

## 2025-01-08 LAB
ATRIAL RATE: 90 BPM
P AXIS: 82 DEGREES
P OFFSET: 199 MS
P ONSET: 150 MS
PR INTERVAL: 134 MS
Q ONSET: 217 MS
QRS COUNT: 15 BEATS
QRS DURATION: 96 MS
QT INTERVAL: 402 MS
QTC CALCULATION(BAZETT): 491 MS
QTC FREDERICIA: 460 MS
R AXIS: 82 DEGREES
T AXIS: 71 DEGREES
T OFFSET: 418 MS
VENTRICULAR RATE: 90 BPM

## 2025-01-09 NOTE — DOCUMENTATION CLARIFICATION NOTE
"    PATIENT:               MALIA BARTLETT  ACCT #:                  [id number]  MRN:                       [id number]  :                       1990  ADMIT DATE:       2024 9:07 PM  DISCH DATE:        2024 6:50 PM  RESPONDING PROVIDER #:        [id number]          PROVIDER RESPONSE TEXT:    I agree with dietician diagnosis of severe malnutrition on     CDI QUERY TEXT:    Clarification        Instruction:    Based on your assessment of the patient and the clinical information, please provide the requested documentation by clicking on the appropriate radio button and enter any additional information if prompted.    Question: Please further clarify this patient nutritional status as    When answering this query, please exercise your independent professional judgment. The fact that a question is being asked, does not imply that any particular answer is desired or expected.    The patient's clinical indicators include:  Clinical Information: 34 yom with alcohol abuse and nutrition consult on     Clinical Indicators:     Nutrition consult:  \"Nutrition Diagnosis  Malnutrition Diagnosis  Patient has Malnutrition Diagnosis: Yes  Diagnosis Status: New  Malnutrition Diagnosis: Severe malnutrition related to starvation  As Evidenced by: >2% weight loss in one week, <50% of po intake >5 days  Nutrition Interventions/Recommendations    Nutrition Prescription:  Individualized Nutrition Prescription Provided for : prescribed diet to provide adequate calories and protein    Nutrition Interventions:  Interventions: Meals and snacks  Meals and Snacks: General healthful diet    Collaboration and Referral of Nutrition Care: Collaboration by nutrition professional with other providers  Goal: Attend Care rounds\"     ED Provider:  \"Alcohol withdrawal syndrome with complication.  ...presents via EMS with a chief report of nausea and unable to keep food down for the past 5 days.\"     Internal " "medicine:  \"This evening at 1840 he decided to leave finally AGAINST MEDICAL ADVICE\"    Treatment: Ht/Wt, Nutrition consult, 12/30 Sodium Chloride 1000ml IVF bolus, regular diet, 12/31 Vitamin B1 100mg injection, correction of electrolyte imbalances, 12/31 Multivitamin 1 tab PO, 12/31 Folic Acid 1mg PO    Risk Factors: Alcohol abuse  Options provided:  -- I agree with dietician diagnosis of severe malnutrition on 12/31  -- Other - I will add my own diagnosis  -- Refer to Clinical Documentation Reviewer    Query created by: Saida Navarrete on 1/3/2025 3:25 PM      Electronically signed by:  MACRINA BLOUNT MD 1/9/2025 9:59 AM          "

## 2025-01-09 NOTE — DOCUMENTATION CLARIFICATION NOTE
"    PATIENT:               MALIA BARTLETT  ACCT #:                  [id number]  MRN:                       [id number]  :                       1990  ADMIT DATE:       2024 9:07 PM  DISCH DATE:        2024 6:50 PM  RESPONDING PROVIDER #:        [id number]          PROVIDER RESPONSE TEXT:    Non-infectious SIRS with acute organ dysfunction of Hyperbilirubinemia    CDI QUERY TEXT:    Clarification        Instruction:    Based on your assessment of the patient and the clinical information, please provide the requested documentation by clicking on the appropriate radio button and enter any additional information if prompted.    Question: Please further clarify if there is a diagnosis related to the clinical information    When answering this query, please exercise your independent professional judgment. The fact that a question is being asked, does not imply that any particular answer is desired or expected.    The patient's clinical indicators include:  Clinical Information: 34 yom with alcohol abuse and withdrawal    Clinical Indicators:    - Total bilirubin: 2.5/3.3/2.4/1.7     WBC: 22.2     Pulse: 93/94.....     ED Provider:  \"Alcohol withdrawal syndrome with complication.  ...presents via EMS with a chief report of nausea and unable to keep food down for the past 5 days.  Patient however states his wife left him approximately 5 days before [place] and he has been drinking ever since.  Last time he drank was yesterday at some point.\"     H/P:  \"Alcohol abuse  Hyponatremia  Hypokalemia  Hypomagnesemia\"     Internal medicine:  \"This evening at 1840 he decided to leave finally AGAINST MEDICAL ADVICE\"    Treatment: Labs, ECG, Ht/Wt, alcohol withdrawal screening,  Vitamin B1 100mg injection, CIWA, correction of electrolyte imbalances, Depade and    Campral ordered on  but not given prior to leaving AMA,  Ativan 2mg IV x1 then change to 0.5mg q2h PRN anxiety " on 12/31, 12/31 Multivitamin 1 tab PO, 12/31 Folic Acid 1mg PO, Nutrition consult, 12/30 Sodium Chloride 1000ml IVF bolus    Risk Factors: Alcohol abuse  Options provided:  -- Non-infectious SIRS with acute organ dysfunction of Hyperbilirubinemia  -- Non-infectious SIRS without acute organ dysfunction  -- Other - I will add my own diagnosis  -- Refer to Clinical Documentation Reviewer    Query created by: Saida Navarrete on 1/3/2025 3:17 PM      Electronically signed by:  MACRINA BLOUNT MD 1/9/2025 9:59 AM

## 2025-01-10 ENCOUNTER — APPOINTMENT (OUTPATIENT)
Dept: PRIMARY CARE | Facility: CLINIC | Age: 35
End: 2025-01-10
Payer: COMMERCIAL

## 2025-07-10 ENCOUNTER — HOSPITAL ENCOUNTER (EMERGENCY)
Facility: HOSPITAL | Age: 35
Discharge: HOME | End: 2025-07-10
Attending: EMERGENCY MEDICINE
Payer: COMMERCIAL

## 2025-07-10 ENCOUNTER — APPOINTMENT (OUTPATIENT)
Dept: RADIOLOGY | Facility: HOSPITAL | Age: 35
End: 2025-07-10
Payer: COMMERCIAL

## 2025-07-10 VITALS
DIASTOLIC BLOOD PRESSURE: 84 MMHG | BODY MASS INDEX: 21.03 KG/M2 | TEMPERATURE: 98.3 F | RESPIRATION RATE: 18 BRPM | OXYGEN SATURATION: 96 % | HEART RATE: 77 BPM | SYSTOLIC BLOOD PRESSURE: 126 MMHG | WEIGHT: 134 LBS | HEIGHT: 67 IN

## 2025-07-10 DIAGNOSIS — F10.10 ALCOHOL ABUSE: Primary | ICD-10-CM

## 2025-07-10 LAB
ALBUMIN SERPL BCP-MCNC: 4.7 G/DL (ref 3.4–5)
ALP SERPL-CCNC: 94 U/L (ref 33–120)
ALT SERPL W P-5'-P-CCNC: 24 U/L (ref 10–52)
AMPHETAMINES UR QL SCN: NORMAL
ANION GAP SERPL CALC-SCNC: 18 MMOL/L (ref 10–20)
APAP SERPL-MCNC: <10 UG/ML (ref ?–30)
APPEARANCE UR: CLEAR
AST SERPL W P-5'-P-CCNC: 36 U/L (ref 9–39)
BARBITURATES UR QL SCN: NORMAL
BASOPHILS # BLD AUTO: 0.07 X10*3/UL (ref 0–0.1)
BASOPHILS NFR BLD AUTO: 0.7 %
BENZODIAZ UR QL SCN: NORMAL
BILIRUB SERPL-MCNC: 1.1 MG/DL (ref 0–1.2)
BILIRUB UR STRIP.AUTO-MCNC: NEGATIVE MG/DL
BUN SERPL-MCNC: 6 MG/DL (ref 6–23)
BZE UR QL SCN: NORMAL
CALCIUM SERPL-MCNC: 9.7 MG/DL (ref 8.6–10.3)
CANNABINOIDS UR QL SCN: NORMAL
CHLORIDE SERPL-SCNC: 99 MMOL/L (ref 98–107)
CO2 SERPL-SCNC: 26 MMOL/L (ref 21–32)
COLOR UR: COLORLESS
CREAT SERPL-MCNC: 0.6 MG/DL (ref 0.5–1.3)
EGFRCR SERPLBLD CKD-EPI 2021: >90 ML/MIN/1.73M*2
EOSINOPHIL # BLD AUTO: 0.07 X10*3/UL (ref 0–0.7)
EOSINOPHIL NFR BLD AUTO: 0.7 %
ERYTHROCYTE [DISTWIDTH] IN BLOOD BY AUTOMATED COUNT: 11.9 % (ref 11.5–14.5)
ETHANOL SERPL-MCNC: 319 MG/DL
FENTANYL+NORFENTANYL UR QL SCN: NORMAL
GLUCOSE SERPL-MCNC: 102 MG/DL (ref 74–99)
GLUCOSE UR STRIP.AUTO-MCNC: NORMAL MG/DL
HCT VFR BLD AUTO: 44.7 % (ref 41–52)
HGB BLD-MCNC: 16 G/DL (ref 13.5–17.5)
IMM GRANULOCYTES # BLD AUTO: 0.02 X10*3/UL (ref 0–0.7)
IMM GRANULOCYTES NFR BLD AUTO: 0.2 % (ref 0–0.9)
KETONES UR STRIP.AUTO-MCNC: NEGATIVE MG/DL
LEUKOCYTE ESTERASE UR QL STRIP.AUTO: NEGATIVE
LIPASE SERPL-CCNC: 28 U/L (ref 9–82)
LYMPHOCYTES # BLD AUTO: 2.81 X10*3/UL (ref 1.2–4.8)
LYMPHOCYTES NFR BLD AUTO: 27.6 %
MAGNESIUM SERPL-MCNC: 2 MG/DL (ref 1.6–2.4)
MCH RBC QN AUTO: 32.4 PG (ref 26–34)
MCHC RBC AUTO-ENTMCNC: 35.8 G/DL (ref 32–36)
MCV RBC AUTO: 91 FL (ref 80–100)
METHADONE UR QL SCN: NORMAL
MONOCYTES # BLD AUTO: 0.96 X10*3/UL (ref 0.1–1)
MONOCYTES NFR BLD AUTO: 9.4 %
NEUTROPHILS # BLD AUTO: 6.26 X10*3/UL (ref 1.2–7.7)
NEUTROPHILS NFR BLD AUTO: 61.4 %
NITRITE UR QL STRIP.AUTO: NEGATIVE
NRBC BLD-RTO: 0 /100 WBCS (ref 0–0)
OPIATES UR QL SCN: NORMAL
OXYCODONE+OXYMORPHONE UR QL SCN: NORMAL
PCP UR QL SCN: NORMAL
PH UR STRIP.AUTO: 6.5 [PH]
PLATELET # BLD AUTO: 227 X10*3/UL (ref 150–450)
POTASSIUM SERPL-SCNC: 3.9 MMOL/L (ref 3.5–5.3)
PROT SERPL-MCNC: 7.3 G/DL (ref 6.4–8.2)
PROT UR STRIP.AUTO-MCNC: NEGATIVE MG/DL
RBC # BLD AUTO: 4.94 X10*6/UL (ref 4.5–5.9)
RBC # UR STRIP.AUTO: NEGATIVE MG/DL
SALICYLATES SERPL-MCNC: <3 MG/DL (ref ?–20)
SODIUM SERPL-SCNC: 139 MMOL/L (ref 136–145)
SP GR UR STRIP.AUTO: 1
UROBILINOGEN UR STRIP.AUTO-MCNC: NORMAL MG/DL
WBC # BLD AUTO: 10.2 X10*3/UL (ref 4.4–11.3)

## 2025-07-10 PROCEDURE — 99285 EMERGENCY DEPT VISIT HI MDM: CPT | Mod: 25

## 2025-07-10 PROCEDURE — 80179 DRUG ASSAY SALICYLATE: CPT | Performed by: EMERGENCY MEDICINE

## 2025-07-10 PROCEDURE — 83735 ASSAY OF MAGNESIUM: CPT | Performed by: EMERGENCY MEDICINE

## 2025-07-10 PROCEDURE — 85025 COMPLETE CBC W/AUTO DIFF WBC: CPT | Performed by: EMERGENCY MEDICINE

## 2025-07-10 PROCEDURE — 99284 EMERGENCY DEPT VISIT MOD MDM: CPT | Mod: 25 | Performed by: EMERGENCY MEDICINE

## 2025-07-10 PROCEDURE — 81003 URINALYSIS AUTO W/O SCOPE: CPT | Mod: 59 | Performed by: EMERGENCY MEDICINE

## 2025-07-10 PROCEDURE — 80307 DRUG TEST PRSMV CHEM ANLYZR: CPT | Performed by: EMERGENCY MEDICINE

## 2025-07-10 PROCEDURE — 2500000001 HC RX 250 WO HCPCS SELF ADMINISTERED DRUGS (ALT 637 FOR MEDICARE OP): Performed by: EMERGENCY MEDICINE

## 2025-07-10 PROCEDURE — 71045 X-RAY EXAM CHEST 1 VIEW: CPT | Mod: FOREIGN READ | Performed by: RADIOLOGY

## 2025-07-10 PROCEDURE — 36415 COLL VENOUS BLD VENIPUNCTURE: CPT | Performed by: EMERGENCY MEDICINE

## 2025-07-10 PROCEDURE — 2500000004 HC RX 250 GENERAL PHARMACY W/ HCPCS (ALT 636 FOR OP/ED): Performed by: EMERGENCY MEDICINE

## 2025-07-10 PROCEDURE — 84075 ASSAY ALKALINE PHOSPHATASE: CPT | Performed by: EMERGENCY MEDICINE

## 2025-07-10 PROCEDURE — 96374 THER/PROPH/DIAG INJ IV PUSH: CPT

## 2025-07-10 PROCEDURE — 83690 ASSAY OF LIPASE: CPT | Performed by: EMERGENCY MEDICINE

## 2025-07-10 PROCEDURE — 96361 HYDRATE IV INFUSION ADD-ON: CPT

## 2025-07-10 PROCEDURE — 71045 X-RAY EXAM CHEST 1 VIEW: CPT

## 2025-07-10 RX ORDER — DIAZEPAM 5 MG/ML
10 INJECTION, SOLUTION INTRAMUSCULAR; INTRAVENOUS EVERY 2 HOUR PRN
Status: DISCONTINUED | OUTPATIENT
Start: 2025-07-10 | End: 2025-07-10 | Stop reason: HOSPADM

## 2025-07-10 RX ORDER — FOLIC ACID 1 MG/1
1 TABLET ORAL ONCE
Status: COMPLETED | OUTPATIENT
Start: 2025-07-10 | End: 2025-07-10

## 2025-07-10 RX ORDER — ONDANSETRON HYDROCHLORIDE 2 MG/ML
4 INJECTION, SOLUTION INTRAVENOUS ONCE
Status: COMPLETED | OUTPATIENT
Start: 2025-07-10 | End: 2025-07-10

## 2025-07-10 RX ORDER — LANOLIN ALCOHOL/MO/W.PET/CERES
100 CREAM (GRAM) TOPICAL ONCE
Status: COMPLETED | OUTPATIENT
Start: 2025-07-10 | End: 2025-07-10

## 2025-07-10 RX ORDER — MULTIVIT-MIN/IRON FUM/FOLIC AC 7.5 MG-4
1 TABLET ORAL ONCE
Status: COMPLETED | OUTPATIENT
Start: 2025-07-10 | End: 2025-07-10

## 2025-07-10 RX ADMIN — FOLIC ACID 1 MG: 1 TABLET ORAL at 14:09

## 2025-07-10 RX ADMIN — Medication 100 MG: at 14:09

## 2025-07-10 RX ADMIN — SODIUM CHLORIDE 1000 ML: 0.9 INJECTION, SOLUTION INTRAVENOUS at 14:08

## 2025-07-10 RX ADMIN — Medication 1 TABLET: at 14:09

## 2025-07-10 RX ADMIN — ONDANSETRON 4 MG: 2 INJECTION INTRAMUSCULAR; INTRAVENOUS at 14:09

## 2025-07-10 ASSESSMENT — LIFESTYLE VARIABLES
HEADACHE, FULLNESS IN HEAD: NOT PRESENT
ORIENTATION AND CLOUDING OF SENSORIUM: ORIENTED AND CAN DO SERIAL ADDITIONS
ORIENTATION AND CLOUDING OF SENSORIUM: ORIENTED AND CAN DO SERIAL ADDITIONS
ANXIETY: NO ANXIETY, AT EASE
PAROXYSMAL SWEATS: NO SWEAT VISIBLE
AGITATION: NORMAL ACTIVITY
AGITATION: NORMAL ACTIVITY
TOTAL SCORE: 0
AUDITORY DISTURBANCES: NOT PRESENT
TOTAL SCORE: 0
NAUSEA AND VOMITING: NO NAUSEA AND NO VOMITING
PAROXYSMAL SWEATS: NO SWEAT VISIBLE
TREMOR: NO TREMOR
TREMOR: NO TREMOR
VISUAL DISTURBANCES: NOT PRESENT
NAUSEA AND VOMITING: NO NAUSEA AND NO VOMITING
AUDITORY DISTURBANCES: NOT PRESENT
VISUAL DISTURBANCES: NOT PRESENT
HEADACHE, FULLNESS IN HEAD: NOT PRESENT
ANXIETY: NO ANXIETY, AT EASE

## 2025-07-10 ASSESSMENT — PAIN SCALES - GENERAL
PAINLEVEL_OUTOF10: 0 - NO PAIN

## 2025-07-10 ASSESSMENT — PAIN - FUNCTIONAL ASSESSMENT
PAIN_FUNCTIONAL_ASSESSMENT: 0-10
PAIN_FUNCTIONAL_ASSESSMENT: 0-10

## 2025-07-10 NOTE — ED PROVIDER NOTES
HPI   Chief Complaint   Patient presents with    Alcohol Problem     Pt states he needs help seeking alcohol rehab treatment. Denies SI or HI. Pt drinks 20 beers a day. Last drink was at 10am. Pt states that he hasn't eaten in 5 days       Limitations to History: None    HPI: 35-year-old male presents with concern for alcohol withdrawal and requesting rehab.  States that he drinks approximately 20 beers per day.  States he has been binging over the past 2 weeks.  Decreased solid intake other than beer.  States that he is nauseous.  Slightly diaphoretic.  Palpitations.  History of previous alcohol withdrawal.    ------------------------------------------------------------------------------------------------------------------------------------------  Physical Exam:    VS: As documented in the triage note and EMR flowsheet from this visit were reviewed.    Appearance: Alert. cooperative,  in no acute distress.   Skin: Intact,  dry skin, no lesions, rash, petechiae or purpura.   Eyes: PERRLA, EOMs intact,  Conjunctiva pink with no redness or exudates.   HENT: Normocephalic, atraumatic. Nares patent. No intraoral lesions.   Neck: Supple, without meningismus. Trachea at midline. No lymphadenopathy.  Pulmonary: Clear bilaterally with good chest wall excursion. No rales, rhonchi or wheezing. No accessory muscle use or stridor.  Cardiac: Tachycardic and regular rhythm, no rubs, murmurs, or gallops.   Abdomen: Abdomen is soft, nontender, and nondistended.  No palpable organomegaly.  No rebound or guarding.  No CVA tenderness. Nonsurgical abdomen.  Genitourinary: Exam deferred.  Musculoskeletal: Full range of motion.  Pulses full and equal. No cyanosis, clubbing, or edema.  Neurological:  Cranial nerves are grossly intact, grossly normal sensation, no weakness, no focal findings identified.  Psychiatric: Appropriate mood and affect.                Patient History   Medical History[1]  Surgical History[2]  Family  History[3]  Social History[4]    Physical Exam   ED Triage Vitals [07/10/25 1318]   Temperature Heart Rate Respirations BP   36.8 °C (98.3 °F) (!) 104 18 (!) 141/107      Pulse Ox Temp Source Heart Rate Source Patient Position   95 % Oral Monitor --      BP Location FiO2 (%)     -- --       Physical Exam      ED Course & MDM   Diagnoses as of 07/11/25 1042   Alcohol abuse                 No data recorded                                 Medical Decision Making  Labs Reviewed  COMPREHENSIVE METABOLIC PANEL - Abnormal     Glucose                       102 (*)                Sodium                        139                    Potassium                     3.9                    Chloride                      99                     Bicarbonate                   26                     Anion Gap                     18                     Urea Nitrogen                 6                      Creatinine                    0.60                   eGFR                          >90                    Calcium                       9.7                    Albumin                       4.7                    Alkaline Phosphatase          94                     Total Protein                 7.3                    AST                           36                     Bilirubin, Total              1.1                    ALT                           24                  ACUTE TOXICOLOGY PANEL, BLOOD - Abnormal     Acetaminophen                 <10.0                  Salicylate                    <3                     Alcohol                       319 (*)             URINALYSIS WITH REFLEX CULTURE AND MICROSCOPIC - Abnormal     Color, Urine                  Colorless (*)               Appearance, Urine             Clear                  Specific Gravity, Urine       1.003 (*)               pH, Urine                     6.5                    Protein, Urine                NEGATIVE                Glucose, Urine                Normal                  Blood, Urine                  NEGATIVE                Ketones, Urine                NEGATIVE                Bilirubin, Urine              NEGATIVE                Urobilinogen, Urine           Normal                 Nitrite, Urine                NEGATIVE                Leukocyte Esterase, Urine     NEGATIVE             MAGNESIUM - Normal     Magnesium                     2.00                LIPASE - Normal     Lipase                        28                         Narrative: Venipuncture immediately after or during the administration of Metamizole may lead to falsely low results. Testing should be performed immediately prior to Metamizole dosing.  DRUG SCREEN,URINE - Normal     Amphetamine Screen, Urine                            Barbiturate Screen, Urine                            Benzodiazepines Screen, Urine                          Cannabinoid Screen, Urine                            Cocaine Metabolite Screen, Urine                          Fentanyl Screen, Urine                               Opiate Screen, Urine                                 Oxycodone Screen, Urine                              PCP Screen, Urine                                    Methadone Screen, Urine                                  Narrative: Drug screen results are presumptive and should not be used to assess                   compliance with prescribed medication. Contact the performing [facility] laboratory                   to add-on definitive confirmatory testing if clinically indicated.                                    Toxicology screening results are reported qualitatively. The concentration must                   be greater than or equal to the cutoff to be reported as positive. The concentration                   at which the screening test can detect an individual drug or metabolite varies.                   The absence of expected drug(s) and/or drug metabolite(s) may indicate non-compliance,                    inappropriate timing of specimen collection relative to drug administration, poor drug                   absorption, diluted/adulterated urine, or limitations of testing. For medical purposes                   only; not valid for forensic use.                                    Interpretive questions should be directed to the laboratory medical directors.  URINALYSIS WITH REFLEX CULTURE AND MICROSCOPIC         Narrative: The following orders were created for panel order Urinalysis with Reflex Culture and Microscopic.                  Procedure                               Abnormality         Status                                     ---------                               -----------         ------                                     Urinalysis with Reflex C...[[id number]]  Abnormal            Final result                               Extra Urine Gray Tube[[id number]]                            Final result                                                 Please view results for these tests on the individual orders.  CBC WITH AUTO DIFFERENTIAL     WBC                           10.2                   nRBC                          0.0                    RBC                           4.94                   Hemoglobin                    16.0                   Hematocrit                    44.7                   MCV                           91                     MCH                           32.4                   MCHC                          35.8                   RDW                           11.9                   Platelets                     227                    Neutrophils %                 61.4                   Immature Granulocytes %, Automated   0.2                    Lymphocytes %                 27.6                   Monocytes %                   9.4                    Eosinophils %                 0.7                    Basophils %                   0.7                    Neutrophils Absolute           6.26                   Immature Granulocytes Absolute, Au*   0.02                   Lymphocytes Absolute          2.81                   Monocytes Absolute            0.96                   Eosinophils Absolute          0.07                   Basophils Absolute            0.07                EXTRA URINE GRAY TUBE  XR chest 1 view   Final Result    Normal heart size with no radiographic signs of active pulmonary    parenchymal infiltration.  Findings similar to the prior.    Signed by Lana Cuellar DO     Medical Decision Making:    Patient appears well nontoxic.  Tachycardic and hypertensive.  Patient treated with 1 L of normal saline, thiamine, Zofran, multivitamin, folic acid.  Lab work unremarkable other than alcohol of 319.  Patient is not clinically intoxicated.  Is ambulatory.  Patient has contacted the alcohol rehab facility in [place].  They are going to send an Uber to pick him up and transport him.  There is family at the bedside.  Stable at time of discharge.    Differential Diagnoses Considered: Alcohol abuse, electrolyte abnormality, volume depletion    Independent Interpretation of Studies:  I independently interpreted: Chest x-ray shows no evidence of pneumonia or pneumothorax.    Escalation of Care:  Appropriate for discharge and will be taken by Uber to alcohol rehab facility.          Procedure  ECG 12 lead    Performed by: Sridhar Maynard DO  Authorized by: Sridhar Maynard DO    ECG interpreted by ED Physician in the absence of a cardiologist: yes    Comments:      EKG interpreted by Dr. Juan M Maynard: Sinus tachycardia 103 bpm.  MD interval 126 ms.  QTc of 448 ms.         [1]   Past Medical History:  Diagnosis Date    Alcohol abuse     Anxiety     Depression    [2]   Past Surgical History:  Procedure Laterality Date    CT ANGIO NECK  07/16/2022    CT NECK ANGIO W AND WO IV CONTRAST [facility]    CT HEAD ANGIO W AND WO IV CONTRAST  07/16/2022    CT HEAD ANGIO W AND  WO IV CONTRAST 7/16/2022 [facility]    VASECTOMY     [3]   Family History  Problem Relation Name Age of Onset    Heart disease Paternal Grandmother      Hypertension Paternal Grandmother     [4]   Social History  Tobacco Use    Smoking status: Never    Smokeless tobacco: Never   Vaping Use    Vaping status: Every Day    Substances: Nicotine    Devices: Disposable   Substance Use Topics    Alcohol use: Yes     Alcohol/week: 140.0 standard drinks of alcohol     Types: 140 Cans of beer per week     Comment: 12-20 beers daily    Drug use: Yes     Types: Marijuana        Sridhar Esquivel DO  07/11/25 1044

## 2025-07-11 ENCOUNTER — HOSPITAL ENCOUNTER (OUTPATIENT)
Dept: CARDIOLOGY | Facility: HOSPITAL | Age: 35
Discharge: HOME | End: 2025-07-11
Payer: COMMERCIAL

## 2025-07-11 LAB — HOLD SPECIMEN: NORMAL

## 2025-07-11 PROCEDURE — 93005 ELECTROCARDIOGRAM TRACING: CPT

## 2025-07-15 LAB
ATRIAL RATE: 103 BPM
P AXIS: 82 DEGREES
P OFFSET: 198 MS
P ONSET: 155 MS
PR INTERVAL: 126 MS
Q ONSET: 218 MS
QRS COUNT: 17 BEATS
QRS DURATION: 96 MS
QT INTERVAL: 342 MS
QTC CALCULATION(BAZETT): 448 MS
QTC FREDERICIA: 409 MS
R AXIS: 54 DEGREES
T AXIS: 58 DEGREES
T OFFSET: 389 MS
VENTRICULAR RATE: 103 BPM